# Patient Record
Sex: MALE | Race: WHITE | NOT HISPANIC OR LATINO | Employment: OTHER | URBAN - METROPOLITAN AREA
[De-identification: names, ages, dates, MRNs, and addresses within clinical notes are randomized per-mention and may not be internally consistent; named-entity substitution may affect disease eponyms.]

---

## 2017-11-17 PROBLEM — G45.9 TIA (TRANSIENT ISCHEMIC ATTACK): Status: ACTIVE | Noted: 2017-11-17

## 2017-11-18 PROBLEM — E78.5 HLD (HYPERLIPIDEMIA): Status: ACTIVE | Noted: 2017-11-18

## 2017-12-13 ENCOUNTER — OFFICE VISIT (OUTPATIENT)
Dept: MEDICAL GROUP | Facility: PHYSICIAN GROUP | Age: 57
End: 2017-12-13
Payer: COMMERCIAL

## 2017-12-13 VITALS
HEART RATE: 91 BPM | WEIGHT: 180 LBS | TEMPERATURE: 98.1 F | SYSTOLIC BLOOD PRESSURE: 120 MMHG | OXYGEN SATURATION: 94 % | BODY MASS INDEX: 25.77 KG/M2 | RESPIRATION RATE: 14 BRPM | DIASTOLIC BLOOD PRESSURE: 70 MMHG | HEIGHT: 70 IN

## 2017-12-13 DIAGNOSIS — G04.90 ENCEPHALITIS: ICD-10-CM

## 2017-12-13 PROCEDURE — 99203 OFFICE O/P NEW LOW 30 MIN: CPT | Performed by: FAMILY MEDICINE

## 2017-12-13 RX ORDER — ASPIRIN 81 MG/1
81 TABLET, CHEWABLE ORAL
COMMUNITY
End: 2018-05-22

## 2017-12-13 RX ORDER — METHYLPREDNISOLONE 4 MG/1
4 TABLET ORAL DAILY
COMMUNITY
End: 2018-05-22

## 2017-12-13 RX ORDER — VALACYCLOVIR HYDROCHLORIDE 1 G/1
1000 TABLET, FILM COATED ORAL 2 TIMES DAILY
COMMUNITY
End: 2018-05-22

## 2017-12-13 RX ORDER — LEVETIRACETAM 500 MG/1
500 TABLET ORAL 2 TIMES DAILY
COMMUNITY
End: 2018-09-04

## 2017-12-13 ASSESSMENT — PATIENT HEALTH QUESTIONNAIRE - PHQ9: CLINICAL INTERPRETATION OF PHQ2 SCORE: 0

## 2017-12-14 ASSESSMENT — ENCOUNTER SYMPTOMS
COUGH: 0
NECK PAIN: 0
NEAR-SYNCOPE: 0
FEVER: 0
MEMORY LOSS: 0
NEUROLOGICAL NEGATIVE: 1
PALPITATIONS: 0
DIZZINESS: 0
SLURRED SPEECH: 1
CARDIOVASCULAR NEGATIVE: 1
MUSCULOSKELETAL NEGATIVE: 1
MYALGIAS: 0
RESPIRATORY NEGATIVE: 1
HEMOPTYSIS: 0
CONSTIPATION: 0
EYES NEGATIVE: 1
LOSS OF BALANCE: 0
WEAKNESS: 0
HEADACHES: 0
CHILLS: 0
ALTERED MENTAL STATUS: 1
GASTROINTESTINAL NEGATIVE: 1
CONSTITUTIONAL NEGATIVE: 1
ABDOMINAL PAIN: 0

## 2017-12-14 NOTE — PROGRESS NOTES
Subjective:      Danielito Wagner is a 57 y.o. male who presents with Pain            Patient is a 57 y.o. Wm who had 2 episodes of slurring speech and confusion  First was seen a [facility] and dx with possible tia and then after second episode was sent to [facility]  There he was found to have some irregularities in the parietal region of his brain on mri with cerebritis/encephalopathy/cva/bleed all on the differential  He was treated with steroids and acyclovir and started on keppra  His sx have resolved and he reports no new issues  He has finished the steroids and acyclovir and continues on the keppra  The plan from the neurologist in hospital was for him to have a f/u MRI in 2 weeks and then see neurology as a f/u  He has the number to call to get his mri done and will do so and then f/u with neurology    There are no diagnoses linked to this encounter.  Past Medical History:  No date: Dislocation of left shoulder joint  11/18/2017: HLD (hyperlipidemia)  Past Surgical History:  12/31/2015: COLONOSCOPY - ENDO      Comment: Procedure: COLONOSCOPY - ENDO;  Surgeon:                Jordan Mancuso M.D.;  Location: SURGERY                [facility];  Service:   1/13/2015: INGUINAL HERNIA LAPAROSCOPIC BILATERAL      Comment: Performed by Danielito Byrne M.D. at SURGERY                [facility]  2005: FOOT SURGERY      Comment: cleaned joint out  Smoking status: Former Smoker                                                              Packs/day: 0.00      Years: 0.00         Types: Cigarettes     Quit date: 1/1/2003  Smokeless tobacco: Never Used                      Alcohol use: Yes           4.0 oz/week     Cans of beer: 8 per week     Comment: occas    Review of patient's family history indicates:    Heart Attack                   Paternal Grandmother        Current Outpatient Prescriptions: •  aspirin (ASA) 81 MG Chew Tab chewable tablet, 81 mg., Disp: , Rfl: •  levetiracetam (KEPPRA) 500 MG Tab, Take 500 mg  by mouth 2 times a day., Disp: , Rfl: •  methylPREDNISolone (MEDROL) 4 MG Tab, Take 4 mg by mouth every day., Disp: , Rfl: •  valacyclovir (VALTREX) 1 GM Tab, Take 1,000 mg by mouth 2 times a day., Disp: , Rfl: •  atorvastatin (LIPITOR) 40 MG Tab, Take 1 Tab by mouth every bedtime., Disp: 30 Tab, Rfl: 1•  aspirin  MG Tablet Delayed Response, Take 0.4985 Tabs by mouth every day., Disp: 60 Tab, Rfl: •  ibuprofen (MOTRIN) 200 MG TABS, Take 400 mg by mouth 1 time daily as needed for Mild Pain., Disp: , Rfl:     Patient was instructed on the use of medications, either prescriptions or OTC and informed on when the appropriate follow up time period should be. In addition, patient was also instructed that should any acute worsening occur that they should notify this clinic asap or call 911.        Neurological Problem   The patient's primary symptoms include an altered mental status and slurred speech. The patient's pertinent negatives include no loss of balance, memory loss, near-syncope, syncope or weakness. This is a new problem. The current episode started 1 to 4 weeks ago. The neurological problem developed suddenly. The problem has been resolved since onset. There was no focality noted. Pertinent negatives include no abdominal pain, auditory change, chest pain, dizziness, fever, headaches, neck pain or palpitations. Past treatments include bed rest (steroids and acyclovir and keppra). The treatment provided significant relief.       Review of Systems   Constitutional: Negative.  Negative for chills and fever.        Past Medical History:  No date: Dislocation of left shoulder joint  11/18/2017: HLD (hyperlipidemia)  Past Surgical History:  12/31/2015: COLONOSCOPY - ENDO      Comment: Procedure: COLONOSCOPY - ENDO;  Surgeon:                Jordan Mancuso M.D.;  Location: SURGERY                [facility];  Service:   1/13/2015: INGUINAL HERNIA LAPAROSCOPIC BILATERAL      Comment: Performed by Danielito Byrne,  "M.D. at SURGERY                [facility]  2005: FOOT SURGERY      Comment: cleaned joint out  Smoking status: Former Smoker                                                              Packs/day: 0.00      Years: 0.00         Types: Cigarettes     Quit date: 1/1/2003  Smokeless tobacco: Never Used                      Alcohol use: Yes           4.0 oz/week     Cans of beer: 8 per week     Comment: occas    Review of patient's family history indicates:    Heart Attack                   Paternal Grandmother       HENT: Negative.    Eyes: Negative.    Respiratory: Negative.  Negative for cough and hemoptysis.    Cardiovascular: Negative.  Negative for chest pain, palpitations and near-syncope.   Gastrointestinal: Negative.  Negative for abdominal pain and constipation.   Genitourinary: Negative.  Negative for dysuria and urgency.   Musculoskeletal: Negative.  Negative for myalgias and neck pain.   Skin: Negative.  Negative for rash.   Neurological: Negative.  Negative for dizziness, syncope, weakness, headaches and loss of balance.   Endo/Heme/Allergies: Negative.    Psychiatric/Behavioral: Negative for memory loss and suicidal ideas.          Objective:     /70   Pulse 91   Temp 36.7 °C (98.1 °F)   Resp 14   Ht 1.778 m (5' 10\")   Wt 81.6 kg (180 lb)   SpO2 94%   BMI 25.83 kg/m²      Physical Exam   Constitutional: He is oriented to person, place, and time. He appears well-developed and well-nourished. No distress.   HENT:   Head: Normocephalic and atraumatic.   Mouth/Throat: Oropharynx is clear and moist. No oropharyngeal exudate.   Eyes: Pupils are equal, round, and reactive to light.   Cardiovascular: Normal rate, regular rhythm, normal heart sounds and intact distal pulses.  Exam reveals no gallop and no friction rub.    No murmur heard.  Pulmonary/Chest: Effort normal and breath sounds normal. No respiratory distress. He has no wheezes. He has no rales. He exhibits no tenderness.   Neurological: He " is alert and oriented to person, place, and time. He displays normal reflexes. No cranial nerve deficit or sensory deficit. He exhibits normal muscle tone. Coordination normal.   Normal neuro exam today   Skin: He is not diaphoretic.   Psychiatric: He has a normal mood and affect. His behavior is normal. Judgment and thought content normal.   Nursing note and vitals reviewed.              Assessment/Plan:     There are no diagnoses linked to this encounter.    Brain event - cerebritis vs. Encephalitis  Will continue with meds and f/u after mri and neurology

## 2018-02-21 ENCOUNTER — OFFICE VISIT (OUTPATIENT)
Dept: MEDICAL GROUP | Facility: PHYSICIAN GROUP | Age: 58
End: 2018-02-21
Payer: COMMERCIAL

## 2018-02-21 VITALS
HEART RATE: 83 BPM | WEIGHT: 183 LBS | DIASTOLIC BLOOD PRESSURE: 90 MMHG | SYSTOLIC BLOOD PRESSURE: 132 MMHG | TEMPERATURE: 98.4 F | HEIGHT: 70 IN | RESPIRATION RATE: 16 BRPM | BODY MASS INDEX: 26.2 KG/M2 | OXYGEN SATURATION: 92 %

## 2018-02-21 DIAGNOSIS — G04.90 CEREBRITIS: ICD-10-CM

## 2018-02-21 PROBLEM — G45.9 TIA (TRANSIENT ISCHEMIC ATTACK): Status: RESOLVED | Noted: 2017-11-17 | Resolved: 2018-02-21

## 2018-02-21 PROCEDURE — 99214 OFFICE O/P EST MOD 30 MIN: CPT | Performed by: FAMILY MEDICINE

## 2018-02-21 NOTE — PROGRESS NOTES
Over 50% of this 25 minute visit was spent on counseling and coordination of care for the problem of  1. Cerebritis  Patient with sx of confusion and weakness. Initial workup in November with mri with swelling and fluid but no mass or trauma or cva  Started on keppra for seizure prevention and treated with steroids and acyclovir  Repeat mri this month still with some findings c/w vasculitis vs. Encephalitis  His neurologist dr. montgomery ordered a LP and labs from that which are pending  Patient reports feeling better with better energy level  Discussed with him today his differential diagnosis and the possible treatments lined up for him  In the future  All questions answered and has appt with neurology in 2 weeks to go over results of lp labs  Will f/u with me in 2 mo    Past Medical History:   Diagnosis Date   • Dislocation of left shoulder joint    • HLD (hyperlipidemia) 11/18/2017        Substance Use Topics   • Smoking status: Former Smoker     Types: Cigarettes     Quit date: 1/1/2003   • Smokeless tobacco: Never Used   • Alcohol use 4.0 oz/week     8 Cans of beer per week      Comment: occas     Family History   Problem Relation Age of Onset   • Heart Attack Paternal Grandmother          Current Outpatient Prescriptions:   •  aspirin (ASA) 81 MG Chew Tab chewable tablet, 81 mg., Disp: , Rfl:   •  levetiracetam (KEPPRA) 500 MG Tab, Take 500 mg by mouth 2 times a day., Disp: , Rfl:   •  atorvastatin (LIPITOR) 40 MG Tab, Take 1 Tab by mouth every bedtime., Disp: 30 Tab, Rfl: 1  •  ibuprofen (MOTRIN) 200 MG TABS, Take 400 mg by mouth 1 time daily as needed for Mild Pain., Disp: , Rfl:   •  methylPREDNISolone (MEDROL) 4 MG Tab, Take 4 mg by mouth every day., Disp: , Rfl:   •  valacyclovir (VALTREX) 1 GM Tab, Take 1,000 mg by mouth 2 times a day., Disp: , Rfl:   •  aspirin  MG Tablet Delayed Response, Take 0.4985 Tabs by mouth every day., Disp: 60 Tab, Rfl:     Patient was instructed on the use of medications,  either prescriptions or OTC and informed on when the appropriate follow up time period should be. In addition, patient was also instructed that should any acute worsening occur that they should notify this clinic asap or call 911.

## 2018-03-06 ENCOUNTER — TELEPHONE (OUTPATIENT)
Dept: MEDICAL GROUP | Facility: PHYSICIAN GROUP | Age: 58
End: 2018-03-06

## 2018-03-06 DIAGNOSIS — G04.90 CEREBRITIS: ICD-10-CM

## 2018-03-06 NOTE — TELEPHONE ENCOUNTER
Dr. Chatterjee office is requesting an updated referral, and was advised it needs to come from the primary care office. The pt has been seen since 11/2017 for this neurology complication.    Needs order faxed to [phone]

## 2018-04-16 ENCOUNTER — TELEPHONE (OUTPATIENT)
Dept: MEDICAL GROUP | Facility: PHYSICIAN GROUP | Age: 58
End: 2018-04-16

## 2018-04-25 ENCOUNTER — OFFICE VISIT (OUTPATIENT)
Dept: MEDICAL GROUP | Facility: PHYSICIAN GROUP | Age: 58
End: 2018-04-25
Payer: COMMERCIAL

## 2018-04-25 VITALS
HEIGHT: 70 IN | BODY MASS INDEX: 25.48 KG/M2 | DIASTOLIC BLOOD PRESSURE: 80 MMHG | SYSTOLIC BLOOD PRESSURE: 122 MMHG | TEMPERATURE: 96.7 F | WEIGHT: 178 LBS | OXYGEN SATURATION: 96 % | RESPIRATION RATE: 14 BRPM | HEART RATE: 74 BPM

## 2018-04-25 DIAGNOSIS — G04.90 CEREBRITIS: ICD-10-CM

## 2018-04-25 PROCEDURE — 99214 OFFICE O/P EST MOD 30 MIN: CPT | Performed by: FAMILY MEDICINE

## 2018-04-25 NOTE — PROGRESS NOTES
Over 50% of this 25 minute visit was spent on counseling and coordination of care regarding the patient's current problem of   1. Cerebritis  Had another episode of anxiety and tingling in his arms -went to [facility] and given high dose of steroids and those sx improved but somewhat shaky at times due to the steroids  Has an appt for full body ct this week to r/o any underlying infection and seeing jung of neurosurgery this week to get a dural biopsy to try and find cause of his issues - either autoimmune vs. Viral  All questions answered today and will do testing to see if we can find a cause  Other than shaky hands due to steroids, neuro exam normal today    Past Medical History:   Diagnosis Date   • Dislocation of left shoulder joint    • HLD (hyperlipidemia) 11/18/2017     Past Surgical History:   Procedure Laterality Date   • COLONOSCOPY - ENDO  12/31/2015    Procedure: COLONOSCOPY - ENDO;  Surgeon: Jordan Mancuso M.D.;  Location: SURGERY [facility];  Service:    • INGUINAL HERNIA LAPAROSCOPIC BILATERAL  1/13/2015    Performed by Danielito Byrne M.D. at SURGERY [facility]   • FOOT SURGERY  2005    cleaned joint out     Social History   Substance Use Topics   • Smoking status: Former Smoker     Types: Cigarettes     Quit date: 1/1/2003   • Smokeless tobacco: Never Used   • Alcohol use 4.0 oz/week     8 Cans of beer per week      Comment: occas     Family History   Problem Relation Age of Onset   • Heart Attack Paternal Grandmother          Current Outpatient Prescriptions:   •  levetiracetam (KEPPRA) 500 MG Tab, Take 500 mg by mouth 2 times a day., Disp: , Rfl:   •  valacyclovir (VALTREX) 1 GM Tab, Take 1,000 mg by mouth 2 times a day., Disp: , Rfl:   •  aspirin  MG Tablet Delayed Response, Take 0.4985 Tabs by mouth every day., Disp: 60 Tab, Rfl:   •  ibuprofen (MOTRIN) 200 MG TABS, Take 400 mg by mouth 1 time daily as needed for Mild Pain., Disp: , Rfl:   •  aspirin (ASA) 81 MG Chew Tab chewable tablet,  81 mg., Disp: , Rfl:   •  methylPREDNISolone (MEDROL) 4 MG Tab, Take 4 mg by mouth every day., Disp: , Rfl:   •  atorvastatin (LIPITOR) 40 MG Tab, Take 1 Tab by mouth every bedtime. (Patient not taking: Reported on 4/25/2018), Disp: 30 Tab, Rfl: 1    Patient was instructed on the use of medications, either prescriptions or OTC and informed on when the appropriate follow up time period should be. In addition, patient was also instructed that should any acute worsening occur that they should notify this clinic asap or call 911.

## 2018-05-22 ENCOUNTER — HOSPITAL ENCOUNTER (OUTPATIENT)
Dept: RADIOLOGY | Facility: MEDICAL CENTER | Age: 58
End: 2018-05-22
Attending: NEUROLOGICAL SURGERY | Admitting: NEUROLOGICAL SURGERY
Payer: COMMERCIAL

## 2018-05-22 DIAGNOSIS — Z01.812 PRE-OPERATIVE LABORATORY EXAMINATION: ICD-10-CM

## 2018-05-22 DIAGNOSIS — Z01.811 PRE-OPERATIVE RESPIRATORY EXAMINATION: ICD-10-CM

## 2018-05-22 DIAGNOSIS — Z01.810 PRE-OPERATIVE CARDIOVASCULAR EXAMINATION: ICD-10-CM

## 2018-05-22 LAB
ABO GROUP BLD: NORMAL
ANION GAP SERPL CALC-SCNC: 9 MMOL/L (ref 0–11.9)
APPEARANCE UR: CLEAR
APTT PPP: 29.8 SEC (ref 24.7–36)
BASOPHILS # BLD AUTO: 0.7 % (ref 0–1.8)
BASOPHILS # BLD: 0.04 K/UL (ref 0–0.12)
BILIRUB UR QL STRIP.AUTO: NEGATIVE
BLD GP AB SCN SERPL QL: NORMAL
BUN SERPL-MCNC: 13 MG/DL (ref 8–22)
CALCIUM SERPL-MCNC: 9.7 MG/DL (ref 8.5–10.5)
CHLORIDE SERPL-SCNC: 104 MMOL/L (ref 96–112)
CO2 SERPL-SCNC: 25 MMOL/L (ref 20–33)
COLOR UR: YELLOW
CREAT SERPL-MCNC: 0.98 MG/DL (ref 0.5–1.4)
EKG IMPRESSION: NORMAL
EOSINOPHIL # BLD AUTO: 0.1 K/UL (ref 0–0.51)
EOSINOPHIL NFR BLD: 1.7 % (ref 0–6.9)
ERYTHROCYTE [DISTWIDTH] IN BLOOD BY AUTOMATED COUNT: 40.6 FL (ref 35.9–50)
GLUCOSE SERPL-MCNC: 88 MG/DL (ref 65–99)
GLUCOSE UR STRIP.AUTO-MCNC: NEGATIVE MG/DL
HCT VFR BLD AUTO: 47.7 % (ref 42–52)
HGB BLD-MCNC: 16.4 G/DL (ref 14–18)
IMM GRANULOCYTES # BLD AUTO: 0.02 K/UL (ref 0–0.11)
IMM GRANULOCYTES NFR BLD AUTO: 0.3 % (ref 0–0.9)
INR PPP: 1.02 (ref 0.87–1.13)
KETONES UR STRIP.AUTO-MCNC: NEGATIVE MG/DL
LEUKOCYTE ESTERASE UR QL STRIP.AUTO: NEGATIVE
LYMPHOCYTES # BLD AUTO: 1.46 K/UL (ref 1–4.8)
LYMPHOCYTES NFR BLD: 25 % (ref 22–41)
MCH RBC QN AUTO: 31.2 PG (ref 27–33)
MCHC RBC AUTO-ENTMCNC: 34.4 G/DL (ref 33.7–35.3)
MCV RBC AUTO: 90.9 FL (ref 81.4–97.8)
MICRO URNS: NORMAL
MONOCYTES # BLD AUTO: 0.4 K/UL (ref 0–0.85)
MONOCYTES NFR BLD AUTO: 6.8 % (ref 0–13.4)
NEUTROPHILS # BLD AUTO: 3.83 K/UL (ref 1.82–7.42)
NEUTROPHILS NFR BLD: 65.5 % (ref 44–72)
NITRITE UR QL STRIP.AUTO: NEGATIVE
NRBC # BLD AUTO: 0 K/UL
NRBC BLD-RTO: 0 /100 WBC
PH UR STRIP.AUTO: 7 [PH]
PLATELET # BLD AUTO: 251 K/UL (ref 164–446)
PMV BLD AUTO: 10 FL (ref 9–12.9)
POTASSIUM SERPL-SCNC: 3.9 MMOL/L (ref 3.6–5.5)
PROT UR QL STRIP: NEGATIVE MG/DL
PROTHROMBIN TIME: 13.1 SEC (ref 12–14.6)
RBC # BLD AUTO: 5.25 M/UL (ref 4.7–6.1)
RBC UR QL AUTO: NEGATIVE
RH BLD: NORMAL
SODIUM SERPL-SCNC: 138 MMOL/L (ref 135–145)
SP GR UR STRIP.AUTO: 1.01
UROBILINOGEN UR STRIP.AUTO-MCNC: 0.2 MG/DL
WBC # BLD AUTO: 5.9 K/UL (ref 4.8–10.8)

## 2018-05-22 PROCEDURE — 71046 X-RAY EXAM CHEST 2 VIEWS: CPT

## 2018-05-22 PROCEDURE — 36415 COLL VENOUS BLD VENIPUNCTURE: CPT

## 2018-05-22 PROCEDURE — 85025 COMPLETE CBC W/AUTO DIFF WBC: CPT

## 2018-05-22 PROCEDURE — 93005 ELECTROCARDIOGRAM TRACING: CPT

## 2018-05-22 PROCEDURE — 80048 BASIC METABOLIC PNL TOTAL CA: CPT

## 2018-05-22 PROCEDURE — 81003 URINALYSIS AUTO W/O SCOPE: CPT

## 2018-05-22 PROCEDURE — 93010 ELECTROCARDIOGRAM REPORT: CPT | Performed by: INTERNAL MEDICINE

## 2018-05-22 PROCEDURE — 86901 BLOOD TYPING SEROLOGIC RH(D): CPT

## 2018-05-22 PROCEDURE — 85610 PROTHROMBIN TIME: CPT

## 2018-05-22 PROCEDURE — 86850 RBC ANTIBODY SCREEN: CPT

## 2018-05-22 PROCEDURE — 85730 THROMBOPLASTIN TIME PARTIAL: CPT

## 2018-05-22 PROCEDURE — 86900 BLOOD TYPING SEROLOGIC ABO: CPT

## 2018-05-23 ENCOUNTER — TELEPHONE (OUTPATIENT)
Dept: MEDICAL GROUP | Facility: PHYSICIAN GROUP | Age: 58
End: 2018-05-23

## 2018-05-23 NOTE — TELEPHONE ENCOUNTER
Patients disability formed are filled but just need to be signed patient is aware and we can call him once the forms have been signed.

## 2018-05-31 ENCOUNTER — HOSPITAL ENCOUNTER (OUTPATIENT)
Facility: MEDICAL CENTER | Age: 58
End: 2018-05-31
Attending: NEUROLOGICAL SURGERY | Admitting: NEUROLOGICAL SURGERY
Payer: COMMERCIAL

## 2018-05-31 ENCOUNTER — APPOINTMENT (OUTPATIENT)
Dept: RADIOLOGY | Facility: MEDICAL CENTER | Age: 58
End: 2018-05-31
Attending: NEUROLOGICAL SURGERY
Payer: COMMERCIAL

## 2018-05-31 VITALS
DIASTOLIC BLOOD PRESSURE: 74 MMHG | HEIGHT: 70 IN | HEART RATE: 80 BPM | WEIGHT: 174.38 LBS | RESPIRATION RATE: 16 BRPM | SYSTOLIC BLOOD PRESSURE: 115 MMHG | TEMPERATURE: 98 F | BODY MASS INDEX: 24.97 KG/M2 | OXYGEN SATURATION: 95 %

## 2018-05-31 LAB
ABO GROUP BLD: NORMAL
BLD GP AB SCN SERPL QL: NORMAL
RH BLD: NORMAL

## 2018-05-31 PROCEDURE — 700101 HCHG RX REV CODE 250

## 2018-05-31 PROCEDURE — 700111 HCHG RX REV CODE 636 W/ 250 OVERRIDE (IP)

## 2018-05-31 PROCEDURE — 86900 BLOOD TYPING SEROLOGIC ABO: CPT

## 2018-05-31 PROCEDURE — 70553 MRI BRAIN STEM W/O & W/DYE: CPT

## 2018-05-31 PROCEDURE — 86850 RBC ANTIBODY SCREEN: CPT

## 2018-05-31 PROCEDURE — 700101 HCHG RX REV CODE 250: Performed by: NEUROLOGICAL SURGERY

## 2018-05-31 PROCEDURE — 86901 BLOOD TYPING SEROLOGIC RH(D): CPT

## 2018-05-31 RX ORDER — LIDOCAINE HYDROCHLORIDE 10 MG/ML
INJECTION, SOLUTION EPIDURAL; INFILTRATION; INTRACAUDAL; PERINEURAL
Status: COMPLETED
Start: 2018-05-31 | End: 2018-05-31

## 2018-05-31 RX ORDER — SODIUM CHLORIDE, SODIUM LACTATE, POTASSIUM CHLORIDE, CALCIUM CHLORIDE 600; 310; 30; 20 MG/100ML; MG/100ML; MG/100ML; MG/100ML
INJECTION, SOLUTION INTRAVENOUS CONTINUOUS
Status: DISCONTINUED | OUTPATIENT
Start: 2018-05-31 | End: 2018-05-31 | Stop reason: HOSPADM

## 2018-05-31 RX ORDER — DIPHENHYDRAMINE HYDROCHLORIDE 50 MG/ML
INJECTION INTRAMUSCULAR; INTRAVENOUS
Status: COMPLETED
Start: 2018-05-31 | End: 2018-05-31

## 2018-05-31 RX ORDER — LIDOCAINE HYDROCHLORIDE 10 MG/ML
0.5 INJECTION, SOLUTION INFILTRATION; PERINEURAL
Status: DISCONTINUED | OUTPATIENT
Start: 2018-05-31 | End: 2018-05-31 | Stop reason: HOSPADM

## 2018-05-31 RX ADMIN — LIDOCAINE HYDROCHLORIDE 0.5 ML: 10 INJECTION, SOLUTION EPIDURAL; INFILTRATION; INTRACAUDAL; PERINEURAL at 15:20

## 2018-05-31 RX ADMIN — DIPHENHYDRAMINE HYDROCHLORIDE 25 MG: 50 INJECTION INTRAMUSCULAR; INTRAVENOUS at 16:15

## 2018-05-31 RX ADMIN — SODIUM CHLORIDE, SODIUM LACTATE, POTASSIUM CHLORIDE, CALCIUM CHLORIDE: 600; 310; 30; 20 INJECTION, SOLUTION INTRAVENOUS at 15:20

## 2018-05-31 RX ADMIN — LIDOCAINE HYDROCHLORIDE 0.5 ML: 10 INJECTION, SOLUTION INFILTRATION; PERINEURAL at 15:20

## 2018-05-31 ASSESSMENT — PAIN SCALES - GENERAL: PAINLEVEL_OUTOF10: 0

## 2018-05-31 NOTE — PROGRESS NOTES
The Medication Reconciliation process has been completed by interviewing the patient via telephone who reports that he is going to take his keppra at 1400 since he will be in surgery, that way he will have had his two doses today and will not require a dose until 6/1/18    Allergies have been reviewed  Antibiotic use in 30 days - none  Home Pharmacy:  [facility]

## 2018-06-01 NOTE — PROGRESS NOTES
No significant intraparenchymal T2/flair change to indicate ongoing inflammation, so no lesion to biopsy there. No dural enhancement, just cortical rim enhancement likely cortical arterial in nature. Not something we want to biopsy. Patient asymptomatic, I advised patient against random biopsy, and he agrees. He will go home and followup w/ Dr Chatetrjee of neurology

## 2018-06-19 ENCOUNTER — TELEPHONE (OUTPATIENT)
Dept: MEDICAL GROUP | Facility: PHYSICIAN GROUP | Age: 58
End: 2018-06-19

## 2018-06-19 NOTE — TELEPHONE ENCOUNTER
Patients disability company called and stated that one of the pages has unknown on it for the return to go back to work.I spoke to patient and he he is having brain surgery and is still being referred out to speclists. He agreed that waiting a year would be ok to see where he is at before making the decision if he can go back to work or not. I called and left a message with Elzbieta at [phone] stating that as of not the return date is 4/25/19.

## 2018-06-25 ENCOUNTER — OFFICE VISIT (OUTPATIENT)
Dept: MEDICAL GROUP | Facility: PHYSICIAN GROUP | Age: 58
End: 2018-06-25
Payer: COMMERCIAL

## 2018-06-25 ENCOUNTER — TELEPHONE (OUTPATIENT)
Dept: MEDICAL GROUP | Facility: PHYSICIAN GROUP | Age: 58
End: 2018-06-25

## 2018-06-25 VITALS
HEIGHT: 70 IN | BODY MASS INDEX: 24.62 KG/M2 | HEART RATE: 75 BPM | TEMPERATURE: 97.7 F | RESPIRATION RATE: 14 BRPM | SYSTOLIC BLOOD PRESSURE: 100 MMHG | DIASTOLIC BLOOD PRESSURE: 60 MMHG | OXYGEN SATURATION: 97 % | WEIGHT: 172 LBS

## 2018-06-25 DIAGNOSIS — L57.8 SOLAR ELASTOSIS: ICD-10-CM

## 2018-06-25 DIAGNOSIS — G04.90 CEREBRITIS: ICD-10-CM

## 2018-06-25 DIAGNOSIS — F41.9 ANXIETY: ICD-10-CM

## 2018-06-25 PROCEDURE — 99215 OFFICE O/P EST HI 40 MIN: CPT | Performed by: FAMILY MEDICINE

## 2018-06-25 RX ORDER — HYDRALAZINE HYDROCHLORIDE 25 MG/1
25 TABLET, FILM COATED ORAL 3 TIMES DAILY PRN
Qty: 90 TAB | Refills: 1 | Status: SHIPPED | OUTPATIENT
Start: 2018-06-25 | End: 2018-09-04

## 2018-06-25 NOTE — TELEPHONE ENCOUNTER
Patient requesting referral to [facility]. Needs a referral to be seen. Please sign pending referral. Thank you

## 2018-06-25 NOTE — PROGRESS NOTES
Over 50% of this 40 minute visit was spent on counseling and coordination of care regarding the patient's current problem of       1. Cerebritis  See disability form filled out and scanned into media    2. Anxiety    - hydrALAZINE (APRESOLINE) 25 MG Tab; Take 1 Tab by mouth 3 times a day as needed (anxiety).  Dispense: 90 Tab; Refill: 1    Past Medical History:   Diagnosis Date   • Dislocation of left shoulder joint    • HLD (hyperlipidemia) 11/18/2017     Past Surgical History:   Procedure Laterality Date   • COLONOSCOPY - ENDO  12/31/2015    Procedure: COLONOSCOPY - ENDO;  Surgeon: Jordan Mancuso M.D.;  Location: SURGERY [facility];  Service:    • INGUINAL HERNIA LAPAROSCOPIC BILATERAL  1/13/2015    Performed by Danielito Byrne M.D. at SURGERY [facility]   • FOOT SURGERY  2005    cleaned joint out     Social History   Substance Use Topics   • Smoking status: Former Smoker     Years: 10.00     Types: Cigarettes     Quit date: 1/1/2008   • Smokeless tobacco: Never Used   • Alcohol use No     Family History   Problem Relation Age of Onset   • Heart Attack Paternal Grandmother          Current Outpatient Prescriptions:   •  hydrALAZINE (APRESOLINE) 25 MG Tab, Take 1 Tab by mouth 3 times a day as needed (anxiety)., Disp: 90 Tab, Rfl: 1  •  levetiracetam (KEPPRA) 500 MG Tab, Take 500 mg by mouth 2 times a day., Disp: , Rfl:     Patient was instructed on the use of medications, either prescriptions or OTC and informed on when the appropriate follow up time period should be. In addition, patient was also instructed that should any acute worsening occur that they should notify this clinic asap or call 911.

## 2018-06-27 ENCOUNTER — TELEPHONE (OUTPATIENT)
Dept: MEDICAL GROUP | Facility: PHYSICIAN GROUP | Age: 58
End: 2018-06-27

## 2018-06-27 DIAGNOSIS — M35.9 AUTOIMMUNE CEREBRITIS (HCC): ICD-10-CM

## 2018-06-27 DIAGNOSIS — G05.3 AUTOIMMUNE CEREBRITIS (HCC): ICD-10-CM

## 2018-06-27 NOTE — TELEPHONE ENCOUNTER
Patient was prescribed hydralazine and the pharmacy wanted to make sure that you actually wanted this medication because this usually isn't treated for anxiety.

## 2018-07-31 ENCOUNTER — TELEPHONE (OUTPATIENT)
Dept: MEDICAL GROUP | Facility: PHYSICIAN GROUP | Age: 58
End: 2018-07-31

## 2018-07-31 NOTE — TELEPHONE ENCOUNTER
Pt came in with a referral from Dr. Chatterjee's office to [facility], however [facility] will not accept it since it did not come through his pcp. Pt would like to have a new referral placed. It is for a stat brain and dural biopsy with dx code G05.3

## 2018-08-01 ENCOUNTER — DOCUMENTATION (OUTPATIENT)
Dept: MEDICAL GROUP | Facility: PHYSICIAN GROUP | Age: 58
End: 2018-08-01

## 2018-08-02 NOTE — PROGRESS NOTES
Nia with auth [id number], called regarding the auth that was approved for the pt.     Auth # [id number]  Approved for 6 visits- Neuro 07/31/18-04/27/19

## 2018-09-04 ENCOUNTER — OFFICE VISIT (OUTPATIENT)
Dept: MEDICAL GROUP | Facility: PHYSICIAN GROUP | Age: 58
End: 2018-09-04
Payer: COMMERCIAL

## 2018-09-04 VITALS
BODY MASS INDEX: 24.91 KG/M2 | TEMPERATURE: 98.2 F | WEIGHT: 174 LBS | OXYGEN SATURATION: 96 % | SYSTOLIC BLOOD PRESSURE: 122 MMHG | HEART RATE: 84 BPM | DIASTOLIC BLOOD PRESSURE: 78 MMHG | HEIGHT: 70 IN | RESPIRATION RATE: 16 BRPM

## 2018-09-04 DIAGNOSIS — G04.90 CEREBRITIS: ICD-10-CM

## 2018-09-04 PROCEDURE — 99214 OFFICE O/P EST MOD 30 MIN: CPT | Performed by: FAMILY MEDICINE

## 2018-09-04 RX ORDER — HYDROXYZINE HYDROCHLORIDE 25 MG/1
25 TABLET, FILM COATED ORAL 3 TIMES DAILY PRN
COMMUNITY
End: 2018-09-04

## 2018-09-04 RX ORDER — METHYLPREDNISOLONE 4 MG/1
TABLET ORAL
Qty: 21 TAB | Refills: 0 | Status: SHIPPED | OUTPATIENT
Start: 2018-09-04 | End: 2019-11-19

## 2018-09-04 ASSESSMENT — ENCOUNTER SYMPTOMS
CHILLS: 0
BRUISES/BLEEDS EASILY: 0
FEVER: 0
PSYCHIATRIC NEGATIVE: 1
MUSCULOSKELETAL NEGATIVE: 1
PALPITATIONS: 0
DEPRESSION: 0
HEADACHES: 0
TINGLING: 0
BLURRED VISION: 0
DOUBLE VISION: 0
GASTROINTESTINAL NEGATIVE: 1
RESPIRATORY NEGATIVE: 1
HEARTBURN: 0
NAUSEA: 0
NEUROLOGICAL NEGATIVE: 1
COUGH: 0
CARDIOVASCULAR NEGATIVE: 1
HEMOPTYSIS: 0
EYES NEGATIVE: 1
MYALGIAS: 0
DIZZINESS: 0

## 2018-09-04 NOTE — PROGRESS NOTES
Subjective:      Danielito Wagner is a 57 y.o. male who presents with Anxiety            1. Cerebritis  Patient with a several year history of cerebritis, had a flare up last month that improved with steroids and would like an emergency rx to have on hand should he not be able to get a hold of his neurologist  Has an appt with neurosurgery next week  His visit with [facility] neurosurgery is pending for possible biopsy of brain to get definitive dx  Today feeling better with only some mild fatigue  - MethylPREDNISolone (MEDROL DOSEPAK) 4 MG Tablet Therapy Pack; As directed on the packaging label.  Dispense: 21 Tab; Refill: 0    Past Medical History:  No date: Dislocation of left shoulder joint  11/18/2017: HLD (hyperlipidemia)  Past Surgical History:  12/31/2015: COLONOSCOPY - ENDO      Comment:  Procedure: COLONOSCOPY - ENDO;  Surgeon: Jordan Mancuso M.D.;  Location: SURGERY [facility];  Service:  1/13/2015: INGUINAL HERNIA LAPAROSCOPIC BILATERAL      Comment:  Performed by Danielito Byrne M.D. at SURGERY [facility]  2005: FOOT SURGERY      Comment:  cleaned joint out  Smoking status: Former Smoker                                                              Packs/day: 0.00      Years: 10.00        Types: Cigarettes     Quit date: 1/1/2008  Smokeless tobacco: Never Used                      Alcohol use: No              Review of patient's family history indicates:  Problem: Heart Attack      Relation: Paternal Grandmother       Age of Onset: (Not Specified)       Current Outpatient Prescriptions: •  MethylPREDNISolone (MEDROL DOSEPAK) 4 MG Tablet Therapy Pack, As directed on the packaging label., Disp: 21 Tab, Rfl: 0    Patient was instructed on the use of medications, either prescriptions or OTC and informed on when the appropriate follow up time period should be. In addition, patient was also instructed that should any acute worsening occur that they should notify this clinic asap or call  "911.            Review of Systems   Constitutional: Positive for malaise/fatigue. Negative for chills and fever.   HENT: Negative.  Negative for hearing loss.    Eyes: Negative.  Negative for blurred vision and double vision.   Respiratory: Negative.  Negative for cough and hemoptysis.    Cardiovascular: Negative.  Negative for chest pain and palpitations.   Gastrointestinal: Negative.  Negative for heartburn and nausea.   Genitourinary: Negative.  Negative for dysuria.   Musculoskeletal: Negative.  Negative for myalgias.   Skin: Negative.  Negative for rash.   Neurological: Negative.  Negative for dizziness, tingling and headaches.   Endo/Heme/Allergies: Negative.  Does not bruise/bleed easily.   Psychiatric/Behavioral: Negative.  Negative for depression and suicidal ideas.   All other systems reviewed and are negative.         Objective:     /78   Pulse 84   Temp 36.8 °C (98.2 °F)   Resp 16   Ht 1.778 m (5' 10\")   Wt 78.9 kg (174 lb)   SpO2 96%   BMI 24.97 kg/m²      Physical Exam   Constitutional: He is oriented to person, place, and time. He appears well-developed and well-nourished. No distress.   HENT:   Head: Normocephalic and atraumatic.   Mouth/Throat: Oropharynx is clear and moist. No oropharyngeal exudate.   Eyes: Pupils are equal, round, and reactive to light.   Cardiovascular: Normal rate, regular rhythm, normal heart sounds and intact distal pulses.  Exam reveals no gallop and no friction rub.    No murmur heard.  Pulmonary/Chest: Effort normal and breath sounds normal. No respiratory distress. He has no wheezes. He has no rales. He exhibits no tenderness.   Neurological: He is alert and oriented to person, place, and time. He displays normal reflexes. No cranial nerve deficit or sensory deficit. He exhibits normal muscle tone. Coordination normal.   Skin: He is not diaphoretic.   Psychiatric: He has a normal mood and affect. His behavior is normal. Judgment and thought content normal. "   Nursing note and vitals reviewed.              Assessment/Plan:     1. Cerebritis    - MethylPREDNISolone (MEDROL DOSEPAK) 4 MG Tablet Therapy Pack; As directed on the packaging label.  Dispense: 21 Tab; Refill: 0

## 2018-10-31 ENCOUNTER — TELEPHONE (OUTPATIENT)
Dept: MEDICAL GROUP | Facility: PHYSICIAN GROUP | Age: 58
End: 2018-10-31

## 2018-10-31 NOTE — TELEPHONE ENCOUNTER
Pt came into office stating that he is needing a referral to come from his PCP in order for him to go to [facility] since that is where Shena Sutherland has referred him there for a consult on a brain biopsy. The dr he has been referred to is Dr. Ibarra with [facility].

## 2019-01-23 ENCOUNTER — OFFICE VISIT (OUTPATIENT)
Dept: MEDICAL GROUP | Facility: PHYSICIAN GROUP | Age: 59
End: 2019-01-23
Payer: COMMERCIAL

## 2019-01-23 VITALS
RESPIRATION RATE: 12 BRPM | SYSTOLIC BLOOD PRESSURE: 120 MMHG | HEIGHT: 70 IN | HEART RATE: 69 BPM | OXYGEN SATURATION: 95 % | DIASTOLIC BLOOD PRESSURE: 70 MMHG | BODY MASS INDEX: 25.2 KG/M2 | TEMPERATURE: 97.6 F | WEIGHT: 176 LBS

## 2019-01-23 DIAGNOSIS — L57.8 SOLAR ELASTOSIS: ICD-10-CM

## 2019-01-23 DIAGNOSIS — G04.90 CEREBRITIS: ICD-10-CM

## 2019-01-23 PROCEDURE — 99214 OFFICE O/P EST MOD 30 MIN: CPT | Performed by: FAMILY MEDICINE

## 2019-01-23 ASSESSMENT — PATIENT HEALTH QUESTIONNAIRE - PHQ9: CLINICAL INTERPRETATION OF PHQ2 SCORE: 0

## 2019-01-23 NOTE — LETTER
[facility]  Mio Mcdonald M.D.  [address]  [facility] [address]  Fax: [phone]   Authorization for Release/Disclosure of   Protected Health Information   Name: DANIELITO WAGNER : 1960 SSN: [id number]   Address: [address] [address] Phone:    [phone] (home) [phone] (work)   I authorize the entity listed below to release/disclose the PHI below to:   [facility]/Mio Mcdonald M.D. and Mio Mcdonald M.D.   Provider or Entity Name:     Address   City, State, [facility]   Phone:      Fax:     Reason for request: continuity of care   Information to be released:    [  ] LAST COLONOSCOPY,  including any PATH REPORT and follow-up  [  ] LAST FIT/COLOGUARD RESULT [  ] LAST DEXA  [  ] LAST MAMMOGRAM  [  ] LAST PAP  [  ] LAST LABS [  ] RETINA EXAM REPORT  [  ] IMMUNIZATION RECORDS  [  ] Release all info      [  ] Check here and initial the line next to each item to release ALL health information INCLUDING  _____ Care and treatment for drug and / or alcohol abuse  _____ HIV testing, infection status, or AIDS  _____ Genetic Testing    DATES OF SERVICE OR TIME PERIOD TO BE DISCLOSED: _____________  I understand and acknowledge that:  * This Authorization may be revoked at any time by you in writing, except if your health information has already been used or disclosed.  * Your health information that will be used or disclosed as a result of you signing this authorization could be re-disclosed by the recipient. If this occurs, your re-disclosed health information may no longer be protected by State or Federal laws.  * You may refuse to sign this Authorization. Your refusal will not affect your ability to obtain treatment.  * This Authorization becomes effective upon signing and will  on (date) __________.      If no date is indicated, this Authorization will  one (1) year from the signature date.    Name: Danielito Wagner    Signature:   Date:      1/23/2019       PLEASE FAX REQUESTED RECORDS BACK TO: [phone]

## 2019-03-08 ENCOUNTER — TELEPHONE (OUTPATIENT)
Dept: MEDICAL GROUP | Facility: PHYSICIAN GROUP | Age: 59
End: 2019-03-08

## 2019-03-08 NOTE — TELEPHONE ENCOUNTER
Pt is requesting referral to dermatology he has an appt with Dr. Avery in [place] on Tuesday 3/12/19. I explained this process will not be done before his appt.

## 2019-05-22 ENCOUNTER — OFFICE VISIT (OUTPATIENT)
Dept: MEDICAL GROUP | Facility: PHYSICIAN GROUP | Age: 59
End: 2019-05-22
Payer: COMMERCIAL

## 2019-05-22 VITALS
DIASTOLIC BLOOD PRESSURE: 60 MMHG | WEIGHT: 176 LBS | TEMPERATURE: 95.3 F | OXYGEN SATURATION: 98 % | BODY MASS INDEX: 25.2 KG/M2 | SYSTOLIC BLOOD PRESSURE: 100 MMHG | HEIGHT: 70 IN | HEART RATE: 71 BPM | RESPIRATION RATE: 12 BRPM

## 2019-05-22 DIAGNOSIS — G04.90 CEREBRITIS: ICD-10-CM

## 2019-05-22 DIAGNOSIS — E78.2 MIXED HYPERLIPIDEMIA: ICD-10-CM

## 2019-05-22 DIAGNOSIS — L57.8 SOLAR ELASTOSIS: ICD-10-CM

## 2019-05-22 PROCEDURE — 99214 OFFICE O/P EST MOD 30 MIN: CPT | Performed by: FAMILY MEDICINE

## 2019-05-22 ASSESSMENT — ENCOUNTER SYMPTOMS
DEPRESSION: 0
HEARTBURN: 0
BLURRED VISION: 0
NAUSEA: 0
FEVER: 0
BRUISES/BLEEDS EASILY: 0
PSYCHIATRIC NEGATIVE: 1
CHILLS: 0
DOUBLE VISION: 0
CARDIOVASCULAR NEGATIVE: 1
TINGLING: 0
DIZZINESS: 0
COUGH: 0
PALPITATIONS: 0
MUSCULOSKELETAL NEGATIVE: 1
WEAKNESS: 1
RESPIRATORY NEGATIVE: 1
HEMOPTYSIS: 0
GASTROINTESTINAL NEGATIVE: 1
HEADACHES: 0
MYALGIAS: 0
EYES NEGATIVE: 1

## 2019-05-22 NOTE — PROGRESS NOTES
Subjective:      Danielito Wagner is a 58 y.o. male who presents with Pain (Cerebritis)            1. Cerebritis  Followed by neurology at [place]  Improving but still with sx waxing and waning  Sx primarily fatigue and diffuse weakness  No focal sx  Due to improvement in sx likely autoimmune instead of infectious  Advised on inflammatory diet changes he can do    2. 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 Mixed hyperlipidemia  Currently treated for HLD, taking meds with no new myalgias or joint pain, watching fats in diet  controlled      Past Medical History:  No date: Dislocation of left shoulder joint  11/18/2017: HLD (hyperlipidemia)  Past Surgical History:  12/31/2015: COLONOSCOPY - ENDO      Comment:  Procedure: COLONOSCOPY - ENDO;  Surgeon: Jordan Mancuso M.D.;  Location: SURGERY [facility];  Service:  1/13/2015: INGUINAL HERNIA LAPAROSCOPIC BILATERAL      Comment:  Performed by Danielito Byrne M.D. at SURGERY [facility]  2005: FOOT SURGERY      Comment:  cleaned joint out  Smoking status: Former Smoker                                                              Packs/day: 0.00      Years: 10.00        Types: Cigarettes     Quit date: 1/1/2008  Smokeless tobacco: Never Used                      Alcohol use: No              Review of patient's family history indicates:  Problem: Heart Attack      Relation: Paternal Grandmother       Age of Onset: (Not Specified)       Current Outpatient Prescriptions: •  MethylPREDNISolone (MEDROL DOSEPAK) 4 MG Tablet Therapy Pack, As directed on the packaging label., Disp: 21 Tab, Rfl: 0    Patient was instructed on the use of medications, either prescriptions or OTC and informed on when the appropriate follow up time period should be. In addition, patient was also instructed that should any acute worsening occur that they should notify this clinic  "asap or call 911.            Review of Systems   Constitutional: Positive for malaise/fatigue. Negative for chills and fever.   HENT: Negative.  Negative for hearing loss.    Eyes: Negative.  Negative for blurred vision and double vision.   Respiratory: Negative.  Negative for cough and hemoptysis.    Cardiovascular: Negative.  Negative for chest pain and palpitations.   Gastrointestinal: Negative.  Negative for heartburn and nausea.   Genitourinary: Negative.  Negative for dysuria.   Musculoskeletal: Negative.  Negative for myalgias.   Skin: Negative.  Negative for rash.   Neurological: Positive for weakness. Negative for dizziness, tingling and headaches.   Endo/Heme/Allergies: Negative.  Does not bruise/bleed easily.   Psychiatric/Behavioral: Negative.  Negative for depression and suicidal ideas.   All other systems reviewed and are negative.         Objective:     /60   Pulse 71   Temp (!) 35.2 °C (95.3 °F)   Resp 12   Ht 1.778 m (5' 10\")   Wt 79.8 kg (176 lb)   SpO2 98%   BMI 25.25 kg/m²      Physical Exam   Constitutional: He is oriented to person, place, and time. He appears well-developed and well-nourished. No distress.   HENT:   Head: Normocephalic and atraumatic.   Mouth/Throat: Oropharynx is clear and moist. No oropharyngeal exudate.   Eyes: Pupils are equal, round, and reactive to light.   Cardiovascular: Normal rate, regular rhythm, normal heart sounds and intact distal pulses.  Exam reveals no gallop and no friction rub.    No murmur heard.  Pulmonary/Chest: Effort normal and breath sounds normal. No respiratory distress. He has no wheezes. He has no rales. He exhibits no tenderness.   Neurological: He is alert and oriented to person, place, and time.   Skin: He is not diaphoretic.   Psychiatric: He has a normal mood and affect. His behavior is normal. Judgment and thought content normal.   Nursing note and vitals reviewed.              Assessment/Plan:     1. Cerebritis      2. Solar " elastosis    - REFERRAL TO DERMATOLOGY    3. Mixed hyperlipidemia

## 2019-07-26 ENCOUNTER — TELEPHONE (OUTPATIENT)
Dept: MEDICAL GROUP | Facility: PHYSICIAN GROUP | Age: 59
End: 2019-07-26

## 2019-11-19 ENCOUNTER — OFFICE VISIT (OUTPATIENT)
Dept: MEDICAL GROUP | Facility: PHYSICIAN GROUP | Age: 59
End: 2019-11-19
Payer: COMMERCIAL

## 2019-11-19 VITALS
OXYGEN SATURATION: 98 % | HEIGHT: 70 IN | HEART RATE: 82 BPM | BODY MASS INDEX: 23.19 KG/M2 | RESPIRATION RATE: 12 BRPM | DIASTOLIC BLOOD PRESSURE: 78 MMHG | TEMPERATURE: 98.6 F | SYSTOLIC BLOOD PRESSURE: 122 MMHG | WEIGHT: 162 LBS

## 2019-11-19 DIAGNOSIS — G04.90 CEREBRITIS: ICD-10-CM

## 2019-11-19 PROCEDURE — 99214 OFFICE O/P EST MOD 30 MIN: CPT | Performed by: FAMILY MEDICINE

## 2019-11-19 RX ORDER — INFLUENZA A VIRUS A/BRISBANE/02/2018 IVR-190 (H1N1) ANTIGEN (FORMALDEHYDE INACTIVATED), INFLUENZA A VIRUS A/KANSAS/14/2017 X-327 (H3N2) ANTIGEN (FORMALDEHYDE INACTIVATED), INFLUENZA B VIRUS B/PHUKET/3073/2013 ANTIGEN (FORMALDEHYDE INACTIVATED), AND INFLUENZA B VIRUS B/MARYLAND/15/2016 BX-69A ANTIGEN (FORMALDEHYDE INACTIVATED) 15; 15; 15; 15 UG/.5ML; UG/.5ML; UG/.5ML; UG/.5ML
INJECTION, SUSPENSION INTRAMUSCULAR
COMMUNITY
Start: 2019-10-26 | End: 2020-02-20

## 2019-11-19 NOTE — PROGRESS NOTES
Over 50% of this 25 minute visit was spent on counseling and coordination of care for the problem of  1. Cerebritis  Seen by [place] and they recommend continued surveillance of his condition, he reports some continued fatigue and confusion at times, worse when tired but still with slow steady improvement of his condition. [place] did not recommend any brain biopsy for his condition due to the continued improvement and we will adhere to this plan, needs a new neurologist due to prior one leaving. Neuro exam today non focal  - REFERRAL TO NEUROLOGY    Past Medical History:   Diagnosis Date   • Dislocation of left shoulder joint    • HLD (hyperlipidemia) 2017     Past Surgical History:   Procedure Laterality Date   • COLONOSCOPY - ENDO  2015    Procedure: COLONOSCOPY - ENDO;  Surgeon: Jordan Mancuso M.D.;  Location: SURGERY [facility];  Service:    • INGUINAL HERNIA LAPAROSCOPIC BILATERAL  2015    Performed by Danielito Byrne M.D. at SURGERY [facility]   • FOOT SURGERY      cleaned joint out     Social History     Tobacco Use   • Smoking status: Former Smoker     Years: 10.00     Types: Cigarettes     Last attempt to quit: 2008     Years since quittin.8   • Smokeless tobacco: Never Used   Substance Use Topics   • Alcohol use: No     Alcohol/week: 4.0 oz     Types: 8 Cans of beer per week   • Drug use: No     Family History   Problem Relation Age of Onset   • Heart Attack Paternal Grandmother          Current Outpatient Medications:   •  FLUZONE QUADRIVALENT 0.5 ML Suspension Prefilled Syringe injection, , Disp: , Rfl:     Patient was instructed on the use of medications, either prescriptions or OTC and informed on when the appropriate follow up time period should be. In addition, patient was also instructed that should any acute worsening occur that they should notify this clinic asap or call 911.

## 2020-07-20 ENCOUNTER — OFFICE VISIT (OUTPATIENT)
Dept: MEDICAL GROUP | Facility: PHYSICIAN GROUP | Age: 60
End: 2020-07-20
Payer: COMMERCIAL

## 2020-07-20 VITALS
RESPIRATION RATE: 16 BRPM | SYSTOLIC BLOOD PRESSURE: 124 MMHG | OXYGEN SATURATION: 94 % | HEART RATE: 79 BPM | WEIGHT: 168.3 LBS | TEMPERATURE: 97.7 F | BODY MASS INDEX: 24.09 KG/M2 | HEIGHT: 70 IN | DIASTOLIC BLOOD PRESSURE: 82 MMHG

## 2020-07-20 DIAGNOSIS — M35.9 AUTOIMMUNE CEREBRITIS (HCC): ICD-10-CM

## 2020-07-20 DIAGNOSIS — G05.3 AUTOIMMUNE CEREBRITIS (HCC): ICD-10-CM

## 2020-07-20 PROCEDURE — 99214 OFFICE O/P EST MOD 30 MIN: CPT | Performed by: FAMILY MEDICINE

## 2020-07-20 ASSESSMENT — PATIENT HEALTH QUESTIONNAIRE - PHQ9: CLINICAL INTERPRETATION OF PHQ2 SCORE: 0

## 2020-07-20 ASSESSMENT — FIBROSIS 4 INDEX: FIB4 SCORE: 0.95

## 2020-07-20 NOTE — PROGRESS NOTES
Over 50% of this 25 minute visit ( all time was face to face) was spent on counseling and coordination of care for the problem of  1. Autoimmune cerebritis (HCC)  Still with some sx of fatigue with exertion but no confusion or altered mental status or headaches  Has f/u [facility] with neurology this month will monitor    Past Medical History:   Diagnosis Date   • Dislocation of left shoulder joint    • HLD (hyperlipidemia) 2017     Past Surgical History:   Procedure Laterality Date   • COLONOSCOPY - ENDO  2015    Procedure: COLONOSCOPY - ENDO;  Surgeon: Jordan Mancuso M.D.;  Location: SURGERY [facility];  Service:    • INGUINAL HERNIA LAPAROSCOPIC BILATERAL  2015    Performed by Danielito Byrne M.D. at SURGERY [facility]   • FOOT SURGERY      cleaned joint out     Social History     Tobacco Use   • Smoking status: Former Smoker     Years: 10.00     Types: Cigarettes     Last attempt to quit: 2008     Years since quittin.5   • Smokeless tobacco: Never Used   Substance Use Topics   • Alcohol use: No     Alcohol/week: 4.0 oz     Types: 8 Cans of beer per week   • Drug use: No     Family History   Problem Relation Age of Onset   • Heart Attack Paternal Grandmother        No current outpatient medications on file.    Patient was instructed on the use of medications, either prescriptions or OTC and informed on when the appropriate follow up time period should be. In addition, patient was also instructed that should any acute worsening occur that they should notify this clinic asap or call 911.

## 2020-10-15 ENCOUNTER — HOSPITAL ENCOUNTER (OUTPATIENT)
Dept: LAB | Facility: MEDICAL CENTER | Age: 60
End: 2020-10-15
Attending: PSYCHIATRY & NEUROLOGY
Payer: MEDICARE

## 2020-10-15 DIAGNOSIS — M79.89 OTHER SPECIFIED SOFT TISSUE DISORDERS: ICD-10-CM

## 2020-10-15 DIAGNOSIS — M24.29 DISORDER OF LIGAMENT, OTHER SPECIFIED SITE: ICD-10-CM

## 2020-10-15 DIAGNOSIS — G04.90 CEREBRITIS: ICD-10-CM

## 2020-10-15 LAB
APTT PPP: 36.3 SEC (ref 24.7–36)
BASOPHILS # BLD AUTO: 1 % (ref 0–1.8)
BASOPHILS # BLD: 0.04 K/UL (ref 0–0.12)
EOSINOPHIL # BLD AUTO: 0.15 K/UL (ref 0–0.51)
EOSINOPHIL NFR BLD: 3.6 % (ref 0–6.9)
ERYTHROCYTE [DISTWIDTH] IN BLOOD BY AUTOMATED COUNT: 45.5 FL (ref 35.9–50)
HCT VFR BLD AUTO: 49.3 % (ref 42–52)
HGB BLD-MCNC: 16.4 G/DL (ref 14–18)
IMM GRANULOCYTES # BLD AUTO: 0.01 K/UL (ref 0–0.11)
IMM GRANULOCYTES NFR BLD AUTO: 0.2 % (ref 0–0.9)
INR PPP: 0.98 (ref 0.87–1.13)
LYMPHOCYTES # BLD AUTO: 1.04 K/UL (ref 1–4.8)
LYMPHOCYTES NFR BLD: 25.2 % (ref 22–41)
MCH RBC QN AUTO: 31.3 PG (ref 27–33)
MCHC RBC AUTO-ENTMCNC: 33.3 G/DL (ref 33.7–35.3)
MCV RBC AUTO: 94.1 FL (ref 81.4–97.8)
MONOCYTES # BLD AUTO: 0.33 K/UL (ref 0–0.85)
MONOCYTES NFR BLD AUTO: 8 % (ref 0–13.4)
NEUTROPHILS # BLD AUTO: 2.56 K/UL (ref 1.82–7.42)
NEUTROPHILS NFR BLD: 62 % (ref 44–72)
NRBC # BLD AUTO: 0 K/UL
NRBC BLD-RTO: 0 /100 WBC
PLATELET # BLD AUTO: 245 K/UL (ref 164–446)
PMV BLD AUTO: 10.6 FL (ref 9–12.9)
PROTHROMBIN TIME: 13.3 SEC (ref 12–14.6)
RBC # BLD AUTO: 5.24 M/UL (ref 4.7–6.1)
WBC # BLD AUTO: 4.1 K/UL (ref 4.8–10.8)

## 2020-10-15 PROCEDURE — 86635 COCCIDIOIDES ANTIBODY: CPT | Mod: 91

## 2020-10-15 PROCEDURE — 86162 COMPLEMENT TOTAL (CH50): CPT

## 2020-10-15 PROCEDURE — 86638 Q FEVER ANTIBODY: CPT

## 2020-10-15 PROCEDURE — 86757 RICKETTSIA ANTIBODY: CPT | Mod: 91

## 2020-10-15 PROCEDURE — 86160 COMPLEMENT ANTIGEN: CPT | Mod: 91

## 2020-10-15 PROCEDURE — 85730 THROMBOPLASTIN TIME PARTIAL: CPT

## 2020-10-15 PROCEDURE — 85610 PROTHROMBIN TIME: CPT

## 2020-10-15 PROCEDURE — 85025 COMPLETE CBC W/AUTO DIFF WBC: CPT

## 2020-10-15 PROCEDURE — 36415 COLL VENOUS BLD VENIPUNCTURE: CPT

## 2020-10-15 PROCEDURE — 86698 HISTOPLASMA ANTIBODY: CPT

## 2020-10-16 LAB
C3 SERPL-MCNC: 141.9 MG/DL (ref 87–200)
C4 SERPL-MCNC: 20.4 MG/DL (ref 19–52)

## 2020-10-17 LAB — CH50 SERPL-ACNC: 91.1 U/ML (ref 38.7–89.9)

## 2020-10-18 LAB
R RICKETTSI IGG TITR SER IF: NORMAL {TITER}
R RICKETTSI IGM TITR SER IF: NORMAL {TITER}
R TYPHI IGG TITR SER IF: NORMAL {TITER}
R TYPHI IGM TITR SER IF: NORMAL {TITER}

## 2020-10-20 LAB
C IMMITIS IGM SPEC QL IA: 0.4 IV
COCCIDIOIDES AB SPEC QL ID: NORMAL
COCCIDIOIDES AB TITR SER CF: NORMAL {TITER}
COCCIDIOIDES IGG SPEC QL IA: 0.2 IV
H CAPSUL AB TITR SER ID: NORMAL {TITER}

## 2020-10-21 LAB — TEST NAME 95000: NORMAL

## 2021-01-20 ENCOUNTER — HOSPITAL ENCOUNTER (OUTPATIENT)
Dept: LAB | Facility: MEDICAL CENTER | Age: 61
End: 2021-01-20
Attending: FAMILY MEDICINE
Payer: MEDICARE

## 2021-01-20 ENCOUNTER — OFFICE VISIT (OUTPATIENT)
Dept: MEDICAL GROUP | Facility: PHYSICIAN GROUP | Age: 61
End: 2021-01-20
Payer: MEDICARE

## 2021-01-20 VITALS
DIASTOLIC BLOOD PRESSURE: 76 MMHG | BODY MASS INDEX: 25.05 KG/M2 | HEIGHT: 70 IN | OXYGEN SATURATION: 94 % | TEMPERATURE: 97.5 F | WEIGHT: 175 LBS | SYSTOLIC BLOOD PRESSURE: 114 MMHG | HEART RATE: 92 BPM

## 2021-01-20 DIAGNOSIS — R79.9 ABNORMAL FINDING OF BLOOD CHEMISTRY, UNSPECIFIED: ICD-10-CM

## 2021-01-20 DIAGNOSIS — M35.9 AUTOIMMUNE CEREBRITIS (HCC): ICD-10-CM

## 2021-01-20 DIAGNOSIS — G05.3 AUTOIMMUNE CEREBRITIS (HCC): ICD-10-CM

## 2021-01-20 DIAGNOSIS — R53.81 MALAISE AND FATIGUE: ICD-10-CM

## 2021-01-20 DIAGNOSIS — R53.83 MALAISE AND FATIGUE: ICD-10-CM

## 2021-01-20 LAB
25(OH)D3 SERPL-MCNC: 36 NG/ML (ref 30–100)
ALBUMIN SERPL BCP-MCNC: 4.6 G/DL (ref 3.2–4.9)
ALBUMIN/GLOB SERPL: 1.6 G/DL
ALP SERPL-CCNC: 52 U/L (ref 30–99)
ALT SERPL-CCNC: 51 U/L (ref 2–50)
ANION GAP SERPL CALC-SCNC: 10 MMOL/L (ref 7–16)
AST SERPL-CCNC: 33 U/L (ref 12–45)
BILIRUB SERPL-MCNC: 0.4 MG/DL (ref 0.1–1.5)
BUN SERPL-MCNC: 11 MG/DL (ref 8–22)
CALCIUM SERPL-MCNC: 9.6 MG/DL (ref 8.5–10.5)
CHLORIDE SERPL-SCNC: 103 MMOL/L (ref 96–112)
CO2 SERPL-SCNC: 26 MMOL/L (ref 20–33)
CREAT SERPL-MCNC: 0.9 MG/DL (ref 0.5–1.4)
ERYTHROCYTE [DISTWIDTH] IN BLOOD BY AUTOMATED COUNT: 43.1 FL (ref 35.9–50)
FOLATE SERPL-MCNC: 21.9 NG/ML
GLOBULIN SER CALC-MCNC: 2.8 G/DL (ref 1.9–3.5)
GLUCOSE SERPL-MCNC: 94 MG/DL (ref 65–99)
HCT VFR BLD AUTO: 50 % (ref 42–52)
HGB BLD-MCNC: 17 G/DL (ref 14–18)
IRON SATN MFR SERPL: 41 % (ref 15–55)
IRON SERPL-MCNC: 121 UG/DL (ref 50–180)
MCH RBC QN AUTO: 31.8 PG (ref 27–33)
MCHC RBC AUTO-ENTMCNC: 34 G/DL (ref 33.7–35.3)
MCV RBC AUTO: 93.5 FL (ref 81.4–97.8)
PLATELET # BLD AUTO: 243 K/UL (ref 164–446)
PMV BLD AUTO: 10.1 FL (ref 9–12.9)
POTASSIUM SERPL-SCNC: 4.4 MMOL/L (ref 3.6–5.5)
PROT SERPL-MCNC: 7.4 G/DL (ref 6–8.2)
RBC # BLD AUTO: 5.35 M/UL (ref 4.7–6.1)
SODIUM SERPL-SCNC: 139 MMOL/L (ref 135–145)
T3 SERPL-MCNC: 110 NG/DL (ref 60–181)
T4 FREE SERPL-MCNC: 1.09 NG/DL (ref 0.93–1.7)
TESTOST SERPL-MCNC: 537 NG/DL (ref 175–781)
TIBC SERPL-MCNC: 296 UG/DL (ref 250–450)
TSH SERPL DL<=0.005 MIU/L-ACNC: 0.96 UIU/ML (ref 0.38–5.33)
UIBC SERPL-MCNC: 175 UG/DL (ref 110–370)
VIT B12 SERPL-MCNC: 679 PG/ML (ref 211–911)
WBC # BLD AUTO: 5.7 K/UL (ref 4.8–10.8)

## 2021-01-20 PROCEDURE — 82306 VITAMIN D 25 HYDROXY: CPT

## 2021-01-20 PROCEDURE — 84439 ASSAY OF FREE THYROXINE: CPT

## 2021-01-20 PROCEDURE — 99214 OFFICE O/P EST MOD 30 MIN: CPT | Performed by: FAMILY MEDICINE

## 2021-01-20 PROCEDURE — 80053 COMPREHEN METABOLIC PANEL: CPT

## 2021-01-20 PROCEDURE — 36415 COLL VENOUS BLD VENIPUNCTURE: CPT

## 2021-01-20 PROCEDURE — 84480 ASSAY TRIIODOTHYRONINE (T3): CPT

## 2021-01-20 PROCEDURE — 82746 ASSAY OF FOLIC ACID SERUM: CPT

## 2021-01-20 PROCEDURE — 84443 ASSAY THYROID STIM HORMONE: CPT

## 2021-01-20 PROCEDURE — 85027 COMPLETE CBC AUTOMATED: CPT

## 2021-01-20 PROCEDURE — 84403 ASSAY OF TOTAL TESTOSTERONE: CPT

## 2021-01-20 PROCEDURE — 83540 ASSAY OF IRON: CPT

## 2021-01-20 PROCEDURE — 83550 IRON BINDING TEST: CPT

## 2021-01-20 PROCEDURE — 82607 VITAMIN B-12: CPT

## 2021-01-20 ASSESSMENT — ENCOUNTER SYMPTOMS
COUGH: 0
NAUSEA: 0
RESPIRATORY NEGATIVE: 1
EYES NEGATIVE: 1
CARDIOVASCULAR NEGATIVE: 1
FEVER: 0
DEPRESSION: 0
DOUBLE VISION: 0
HEADACHES: 0
MUSCULOSKELETAL NEGATIVE: 1
CHILLS: 0
PALPITATIONS: 0
HEMOPTYSIS: 0
TINGLING: 0
DIZZINESS: 0
GASTROINTESTINAL NEGATIVE: 1
MYALGIAS: 0
NEUROLOGICAL NEGATIVE: 1
BLURRED VISION: 0
PSYCHIATRIC NEGATIVE: 1
HEARTBURN: 0
GENERAL WELL-BEING: GOOD
BRUISES/BLEEDS EASILY: 0

## 2021-01-20 ASSESSMENT — FIBROSIS 4 INDEX: FIB4 SCORE: 0.97

## 2021-01-20 ASSESSMENT — PATIENT HEALTH QUESTIONNAIRE - PHQ9
5. POOR APPETITE OR OVEREATING: 0 - NOT AT ALL
SUM OF ALL RESPONSES TO PHQ QUESTIONS 1-9: 11
CLINICAL INTERPRETATION OF PHQ2 SCORE: 3

## 2021-01-20 ASSESSMENT — ACTIVITIES OF DAILY LIVING (ADL): BATHING_REQUIRES_ASSISTANCE: 0

## 2021-01-20 NOTE — PROGRESS NOTES
Subjective:      Victor Manuel Wagner is a 60 y.o. male who presents with Annual Exam (Annual Exam)            1. Autoimmune cerebritis (HCC)  Not currently doing any treatment, still f/u with neurology  No pain or dizziness but some fatigue that has not gone away and would like to check to make sure nothing else is going on  - Patient has been identified as having a positive depression screening. Appropriate orders and counseling have been given.  - Comp Metabolic Panel; Future  - FREE THYROXINE; Future  - TRIIDOTHYRONINE; Future  - TESTOSTERONE SERUM; Future  - TSH; Future  - VITAMIN D,25 HYDROXY; Future  - CBC WITHOUT DIFFERENTIAL; Future  - IRON/TOTAL IRON BIND; Future  - VITAMIN B12; Future  - FOLATE; Future    2. Malaise and fatigue    - Comp Metabolic Panel; Future  - FREE THYROXINE; Future  - TRIIDOTHYRONINE; Future  - TESTOSTERONE SERUM; Future  - TSH; Future  - VITAMIN D,25 HYDROXY; Future  - CBC WITHOUT DIFFERENTIAL; Future  - IRON/TOTAL IRON BIND; Future  - VITAMIN B12; Future  - FOLATE; Future    3. Abnormal finding of blood chemistry, unspecified     - IRON/TOTAL IRON BIND; Future    Past Medical History:  No date: Dislocation of left shoulder joint  2017: HLD (hyperlipidemia)  Past Surgical History:  2020: IR RECOVERY ROOM      Comment:  Procedure: IR RECOVERY ROOM;  Surgeon: Ir-Recovery                Surgery;  Location: SURGERY [place];  Service: Recovery  2015: COLONOSCOPY - ENDO      Comment:  Procedure: COLONOSCOPY - ENDO;  Surgeon: Jordan Mancuso M.D.;  Location: [facility];  Service:  2015: INGUINAL HERNIA LAPAROSCOPIC BILATERAL      Comment:  Performed by Danielito Byrne M.D. at SURGERY [facility]  2005: FOOT SURGERY      Comment:  cleaned joint out  Social History    Tobacco Use      Smoking status: Former Smoker        Years: 10.00        Types: Cigarettes        Quit date: 2008        Years since quittin.0      Smokeless tobacco: Never  "Used    Alcohol use: No      Alcohol/week: 4.0 oz      Types: 8 Cans of beer per week    Drug use: No    Review of patient's family history indicates:  Problem: Heart Attack      Relation: Paternal Grandmother          Age of Onset: (Not Specified)      No current outpatient medications on file.    Patient was instructed on the use of medications, either prescriptions or OTC and informed on when the appropriate follow up time period should be. In addition, patient was also instructed that should any acute worsening occur that they should notify this clinic asap or call 911.        Annual Exam  Pertinent negatives include no chest pain, chills, coughing, fever, headaches, myalgias, nausea or rash.       Review of Systems   Constitutional: Positive for malaise/fatigue. Negative for chills and fever.   HENT: Negative.  Negative for hearing loss.    Eyes: Negative.  Negative for blurred vision and double vision.   Respiratory: Negative.  Negative for cough and hemoptysis.    Cardiovascular: Negative.  Negative for chest pain and palpitations.   Gastrointestinal: Negative.  Negative for heartburn and nausea.   Genitourinary: Negative.  Negative for dysuria.   Musculoskeletal: Negative.  Negative for myalgias.   Skin: Negative.  Negative for rash.   Neurological: Negative.  Negative for dizziness, tingling and headaches.   Endo/Heme/Allergies: Negative.  Does not bruise/bleed easily.   Psychiatric/Behavioral: Negative.  Negative for depression and suicidal ideas.   All other systems reviewed and are negative.         Objective:     /76   Pulse 92   Temp 36.4 °C (97.5 °F)   Ht 1.778 m (5' 10\")   Wt 79.4 kg (175 lb)   SpO2 94%   BMI 25.11 kg/m²      Physical Exam  Vitals signs and nursing note reviewed.   Constitutional:       General: He is not in acute distress.     Appearance: He is well-developed. He is not diaphoretic.   HENT:      Head: Normocephalic and atraumatic.      Mouth/Throat:      Pharynx: No " oropharyngeal exudate.   Eyes:      Pupils: Pupils are equal, round, and reactive to light.   Cardiovascular:      Rate and Rhythm: Normal rate and regular rhythm.      Heart sounds: Normal heart sounds. No murmur. No friction rub. No gallop.    Pulmonary:      Effort: Pulmonary effort is normal. No respiratory distress.      Breath sounds: Normal breath sounds. No wheezing or rales.   Chest:      Chest wall: No tenderness.   Neurological:      Mental Status: He is alert and oriented to person, place, and time.   Psychiatric:         Behavior: Behavior normal.         Thought Content: Thought content normal.         Judgment: Judgment normal.                 Assessment/Plan:        1. Autoimmune cerebritis (HCC)    - Patient has been identified as having a positive depression screening. Appropriate orders and counseling have been given.  - Comp Metabolic Panel; Future  - FREE THYROXINE; Future  - TRIIDOTHYRONINE; Future  - TESTOSTERONE SERUM; Future  - TSH; Future  - VITAMIN D,25 HYDROXY; Future  - CBC WITHOUT DIFFERENTIAL; Future  - IRON/TOTAL IRON BIND; Future  - VITAMIN B12; Future  - FOLATE; Future    2. Malaise and fatigue    - Comp Metabolic Panel; Future  - FREE THYROXINE; Future  - TRIIDOTHYRONINE; Future  - TESTOSTERONE SERUM; Future  - TSH; Future  - VITAMIN D,25 HYDROXY; Future  - CBC WITHOUT DIFFERENTIAL; Future  - IRON/TOTAL IRON BIND; Future  - VITAMIN B12; Future  - FOLATE; Future    3. Abnormal finding of blood chemistry, unspecified     - IRON/TOTAL IRON BIND; Future

## 2021-08-28 ENCOUNTER — PATIENT MESSAGE (OUTPATIENT)
Dept: HEALTH INFORMATION MANAGEMENT | Facility: OTHER | Age: 61
End: 2021-08-28

## 2022-01-31 ENCOUNTER — OFFICE VISIT (OUTPATIENT)
Dept: MEDICAL GROUP | Facility: PHYSICIAN GROUP | Age: 62
End: 2022-01-31
Payer: MEDICARE

## 2022-01-31 VITALS
HEART RATE: 104 BPM | OXYGEN SATURATION: 95 % | WEIGHT: 182 LBS | BODY MASS INDEX: 26.05 KG/M2 | TEMPERATURE: 98.4 F | RESPIRATION RATE: 16 BRPM | DIASTOLIC BLOOD PRESSURE: 82 MMHG | HEIGHT: 70 IN | SYSTOLIC BLOOD PRESSURE: 146 MMHG

## 2022-01-31 DIAGNOSIS — Z00.00 MEDICARE ANNUAL WELLNESS VISIT, SUBSEQUENT: ICD-10-CM

## 2022-01-31 DIAGNOSIS — G05.3 AUTOIMMUNE CEREBRITIS (HCC): ICD-10-CM

## 2022-01-31 DIAGNOSIS — M35.9 AUTOIMMUNE CEREBRITIS (HCC): ICD-10-CM

## 2022-01-31 PROCEDURE — G0438 PPPS, INITIAL VISIT: HCPCS | Performed by: FAMILY MEDICINE

## 2022-01-31 ASSESSMENT — PATIENT HEALTH QUESTIONNAIRE - PHQ9: CLINICAL INTERPRETATION OF PHQ2 SCORE: 0

## 2022-01-31 ASSESSMENT — FIBROSIS 4 INDEX: FIB4 SCORE: 1.16

## 2022-01-31 ASSESSMENT — ENCOUNTER SYMPTOMS: GENERAL WELL-BEING: FAIR

## 2022-01-31 ASSESSMENT — ACTIVITIES OF DAILY LIVING (ADL): BATHING_REQUIRES_ASSISTANCE: 0

## 2022-01-31 NOTE — PROGRESS NOTES
Chief Complaint   Patient presents with   • Annual Exam         HPI:  Victor Manuel is a 61 y.o. here for Medicare Annual Wellness Visit        Patient Active Problem List    Diagnosis Date Noted   • Cerebritis 04/25/2018   • HLD (hyperlipidemia) 11/18/2017   • Shoulder dislocation 01/07/2015       No current outpatient medications on file.     No current facility-administered medications for this visit.        Patient is taking medications as noted in medication list.  Current supplements as per medication list.     Allergies: Contrast media with iodine [iodine]    Current social contact/activities:      Is patient current with immunizations? Yes.    He  reports that he quit smoking about 14 years ago. His smoking use included cigarettes. He quit after 10.00 years of use. He has never used smokeless tobacco. He reports that he does not drink alcohol and does not use drugs.  Counseling given: Not Answered        DPA/Advanced directive: Patient does not have an Advanced Directive.  A packet and workshop information was given on Advanced Directives.    ROS:    Gait: Uses no assistive device   Ostomy: No   Other tubes: No   Amputations: No   Chronic oxygen use No   Last eye exam 2021   Wears hearing aids: No   : Denies any urinary leakage during the last 6 months      Screening:        Depression Screening    Little interest or pleasure in doing things?  0 - not at all  Feeling down, depressed, or hopeless? 0 - not at all  Patient Health Questionnaire Score: 0    If depressive symptoms identified deferred to follow up visit unless specifically addressed in assessment and plan.    Interpretation of PHQ-9 Total Score   Score Severity   1-4 No Depression   5-9 Mild Depression   10-14 Moderate Depression   15-19 Moderately Severe Depression   20-27 Severe Depression    Screening for Cognitive Impairment    Three Minute Recall (captain, garden, picture)  2/3    Gualberto clock face with all 12 numbers and set the hands to show 5 past  8.  Yes    If cognitive concerns identified, deferred for follow up unless specifically addressed in assessment and plan.    Fall Risk Assessment    Has the patient had two or more falls in the last year or any fall with injury in the last year?  No  If fall risk identified, deferred for follow up unless specifically addressed in assessment and plan.    Safety Assessment    Throw rugs on floor.  No  Handrails on all stairs.  Yes  Good lighting in all hallways.  Yes  Difficulty hearing.  No  Patient counseled about all safety risks that were identified.    Functional Assessment ADLs    Are there any barriers preventing you from cooking for yourself or meeting nutritional needs?  No.    Are there any barriers preventing you from driving safely or obtaining transportation?  No.    Are there any barriers preventing you from using a telephone or calling for help?  No.    Are there any barriers preventing you from shopping?  No.    Are there any barriers preventing you from taking care of your own finances?  No.    Are there any barriers preventing you from managing your medications?  No.    Are there any barriers preventing you from showering, bathing or dressing yourself?  No.    Are you currently engaging in any exercise or physical activity?  Yes.  Very little  What is your perception of your health?  Fair.    Health Maintenance Summary          Overdue - Annual Wellness Visit (Once) Overdue - never done    No completion history exists for this topic.          Overdue - COVID-19 Vaccine (1) Overdue - never done    No completion history exists for this topic.          Overdue - IMM ZOSTER VACCINES (2 of 2) Overdue since 1/14/2021 11/19/2020  Imm Admin: Zoster Vaccine Recombinant (RZV) (SHINGRIX)          Overdue - IMM INFLUENZA (1) Overdue since 9/1/2021    10/26/2019  Imm Admin: Influenza Vaccine Quad Inj (Pf)    11/03/2018  Imm Admin: Influenza Vac Subunit Quad Inj (Pf)    10/27/2015  Imm Admin: INFLUENZA TIV  (IM)          COLORECTAL CANCER SCREENING (COLONOSCOPY - Every 10 Years) Next due on 2025  COLONOSCOPY (Done)    2015  Surgical Procedure: COLONOSCOPY - ENDO          IMM DTaP/Tdap/Td Vaccine (2 - Td or Tdap) Next due on 2016  Imm Admin: Tdap Vaccine          IMM HEP B VACCINE (Series Information) Aged Out    No completion history exists for this topic.          IMM MENINGOCOCCAL VACCINE (MCV4) (Series Information) Aged Out    No completion history exists for this topic.          IMM PNEUMOCOCCAL VACCINE: 0-64 Years (Series Information) Aged Out    No completion history exists for this topic.          Discontinued - HEPATITIS C SCREENING  Discontinued    No completion history exists for this topic.                Patient Care Team:  Mio Mcdonald M.D. as PCP - General (Family Medicine)  Mio Mcdonald M.D. as PCP - [facility] Paneled    Social History     Tobacco Use   • Smoking status: Former Smoker     Years: 10.00     Types: Cigarettes     Quit date: 2008     Years since quittin.0   • Smokeless tobacco: Never Used   Substance Use Topics   • Alcohol use: No     Alcohol/week: 4.0 oz     Types: 8 Cans of beer per week   • Drug use: No     Family History   Problem Relation Age of Onset   • Heart Attack Paternal Grandmother      He  has a past medical history of Dislocation of left shoulder joint and HLD (hyperlipidemia) (2017). He also has no past medical history of Addisons disease (HCC), Adrenal disorder (HCC), Allergy, unspecified not elsewhere classified, Anemia, Anesthesia, Anxiety, Arrhythmia, Arthritis, Asthma, Asymptomatic human immunodeficiency virus (HIV) infection status (HCC), Blood transfusion, without reported diagnosis, Breath shortness, Cancer (HCC), CHF (congestive heart failure) (HCC), Chronic airway obstruction, not elsewhere classified, Clotting disorder (HCC), Cold, Congestive heart failure (HCC), Cushings syndrome (HCC), Dental disorder,  "Depression, Diabetic neuropathy (HCC), Emphysema, GERD (gastroesophageal reflux disease), Glaucoma, Goiter, Headache(784.0), Headache, classical migraine, Heart attack (HCC), Heart murmur, Hepatitis A, Hepatitis B, Hepatitis C, Hypertension, IBD (inflammatory bowel disease), Kidney disease, Meningitis, Muscle disorder, Myocardial infarct (HCC), Osteoporosis, unspecified, Other and unspecified angina pectoris, Pacemaker, Parathyroid disorder (HCC), Pituitary disease (HCC), Pneumonia, Psychiatric problem, Renal disorder, Rheumatic fever, Seizure (HCC), Sickle cell disease (HCC), Sleep apnea, Snoring, Stroke (HCC), Substance abuse (HCC), Thyroid disease, Tuberculosis, Type II or unspecified type diabetes mellitus without mention of complication, not stated as uncontrolled, Ulcer, Unspecified disorder of thyroid, Unspecified hemorrhagic conditions, Unspecified urinary incontinence, or Urinary tract infection, site not specified.   Past Surgical History:   Procedure Laterality Date   • IR RECOVERY ROOM  11/25/2020    Procedure: IR RECOVERY ROOM;  Surgeon: Ir-Recovery Surgery;  Location: SURGERY [place];  Service: Recovery   • COLONOSCOPY - ENDO  12/31/2015    Procedure: COLONOSCOPY - ENDO;  Surgeon: Jordan Mancuso M.D.;  Location: SURGERY [facility];  Service:    • INGUINAL HERNIA LAPAROSCOPIC BILATERAL  1/13/2015    Performed by Danielito Byrne M.D. at SURGERY [place] [facility]   • FOOT SURGERY  2005    cleaned joint out           Exam:     /82 (BP Location: Left arm, Patient Position: Sitting, BP Cuff Size: Adult)   Pulse (!) 104   Temp 36.9 °C (98.4 °F) (Temporal)   Resp 16   Ht 1.778 m (5' 10\")   Wt 82.6 kg (182 lb)   SpO2 95%  Body mass index is 26.11 kg/m².    Hearing excellent.    Dentition good  Alert, oriented in no acute distress.  Eye contact is good, speech goal directed, affect calm      Assessment and Plan. The following treatment and monitoring plan is recommended:      1. Medicare annual wellness " visit, subsequent  utd on hm    2. Autoimmune cerebritis (HCC)  Sees neurology here and at [place]  Minimal sx unless he gets upset or has his heart rate go up and then gets the pressure and burning in his head. Advised on low impact walking and other things to help    Past Medical History:   Diagnosis Date   • Dislocation of left shoulder joint    • HLD (hyperlipidemia) 2017     Past Surgical History:   Procedure Laterality Date   • IR RECOVERY ROOM  2020    Procedure: IR RECOVERY ROOM;  Surgeon: Ir-Recovery Surgery;  Location: SURGERY [place];  Service: Recovery   • COLONOSCOPY - ENDO  2015    Procedure: COLONOSCOPY - ENDO;  Surgeon: Jordan Mancuso M.D.;  Location: SURGERY [facility];  Service:    • INGUINAL HERNIA LAPAROSCOPIC BILATERAL  2015    Performed by Danielito Byrne M.D. at SURGERY [place] [facility]   • FOOT SURGERY      cleaned joint out     Social History     Tobacco Use   • Smoking status: Former Smoker     Years: 10.00     Types: Cigarettes     Quit date: 2008     Years since quittin.0   • Smokeless tobacco: Never Used   Substance Use Topics   • Alcohol use: No     Alcohol/week: 4.0 oz     Types: 8 Cans of beer per week   • Drug use: No     Family History   Problem Relation Age of Onset   • Heart Attack Paternal Grandmother        No current outpatient medications on file.    Patient was instructed on the use of medications, either prescriptions or OTC and informed on when the appropriate follow up time period should be. In addition, patient was also instructed that should any acute worsening occur that they should notify this clinic asap or call 911.        Services suggested: No services needed at this time  Health Care Screening recommendations as per orders if indicated.  Referrals offered: PT/OT/Nutrition counseling/Behavioral Health/Smoking cessation as per orders if indicated.    Discussion today about general wellness and lifestyle habits:    · Prevent falls  and reduce trip hazards; Cautioned about securing or removing rugs.  · Have a working fire alarm and carbon monoxide detector;   · Engage in regular physical activity and social activities       Follow-up: No follow-ups on file.

## 2022-02-15 ENCOUNTER — PATIENT MESSAGE (OUTPATIENT)
Dept: HEALTH INFORMATION MANAGEMENT | Facility: OTHER | Age: 62
End: 2022-02-15
Payer: MEDICARE

## 2022-03-24 DIAGNOSIS — G04.90 CEREBRITIS: ICD-10-CM

## 2022-03-24 RX ORDER — METHYLPREDNISOLONE 4 MG/1
4 TABLET ORAL DAILY
COMMUNITY
End: 2022-03-24 | Stop reason: SDUPTHER

## 2022-03-24 NOTE — TELEPHONE ENCOUNTER
Patient would like to know if Dr. Mcdonald could order a new prescription for Methylprednisolone 4 MG. [facility] [place] is accurate. Thank you

## 2022-03-28 RX ORDER — METHYLPREDNISOLONE 4 MG/1
4 TABLET ORAL DAILY
Qty: 21 TABLET | Refills: 0 | Status: SHIPPED | OUTPATIENT
Start: 2022-03-28 | End: 2022-06-07

## 2022-06-06 ENCOUNTER — TELEPHONE (OUTPATIENT)
Dept: HEALTH INFORMATION MANAGEMENT | Facility: OTHER | Age: 62
End: 2022-06-06
Payer: MEDICARE

## 2022-06-07 ENCOUNTER — OFFICE VISIT (OUTPATIENT)
Dept: MEDICAL GROUP | Facility: PHYSICIAN GROUP | Age: 62
End: 2022-06-07
Payer: MEDICARE

## 2022-06-07 VITALS
BODY MASS INDEX: 25.37 KG/M2 | SYSTOLIC BLOOD PRESSURE: 136 MMHG | RESPIRATION RATE: 16 BRPM | HEIGHT: 70 IN | DIASTOLIC BLOOD PRESSURE: 84 MMHG | WEIGHT: 177.2 LBS | OXYGEN SATURATION: 96 % | HEART RATE: 82 BPM | TEMPERATURE: 97.8 F

## 2022-06-07 DIAGNOSIS — R10.30 LOWER ABDOMINAL PAIN: ICD-10-CM

## 2022-06-07 DIAGNOSIS — M35.9 AUTOIMMUNE CEREBRITIS (HCC): ICD-10-CM

## 2022-06-07 DIAGNOSIS — Z98.890 HISTORY OF HERNIA REPAIR: ICD-10-CM

## 2022-06-07 DIAGNOSIS — Z87.19 HISTORY OF HERNIA REPAIR: ICD-10-CM

## 2022-06-07 DIAGNOSIS — G05.3 AUTOIMMUNE CEREBRITIS (HCC): ICD-10-CM

## 2022-06-07 PROCEDURE — 99214 OFFICE O/P EST MOD 30 MIN: CPT | Performed by: FAMILY MEDICINE

## 2022-06-07 ASSESSMENT — ENCOUNTER SYMPTOMS
HEMOPTYSIS: 0
CHILLS: 0
ABDOMINAL PAIN: 1
DOUBLE VISION: 0
MYALGIAS: 0
PSYCHIATRIC NEGATIVE: 1
TINGLING: 0
FEVER: 0
DEPRESSION: 0
PALPITATIONS: 0
HEARTBURN: 0
BLURRED VISION: 0
HEADACHES: 0
EYES NEGATIVE: 1
RESPIRATORY NEGATIVE: 1
NAUSEA: 0
MUSCULOSKELETAL NEGATIVE: 1
NEUROLOGICAL NEGATIVE: 1
DIZZINESS: 0
CARDIOVASCULAR NEGATIVE: 1
CONSTITUTIONAL NEGATIVE: 1
COUGH: 0
BRUISES/BLEEDS EASILY: 0

## 2022-06-07 ASSESSMENT — FIBROSIS 4 INDEX: FIB4 SCORE: 1.16

## 2022-06-07 NOTE — PROGRESS NOTES
Subjective     Victor Manuel Wagner is a 61 y.o. male who presents with Other (Having pain and pressure on his stomach for a week and a half wants to make sure its not a hernia  )            1. Autoimmune cerebritis (HCC)  stable    2. History of hernia repair  7 years ago had left inguinal hernia repair with mesh via umbilical incision  Now with diffuse mild abdominal pain for past 2 weeks, not worse or better with eating or drinking or bm, no fever or chills  On exam no acute abdomen today   CT-ABDOMEN-PELVIS W/O; Future    3. Lower abdominal pain     CT-ABDOMEN-PELVIS W/O; Future    Past Medical History:  No date: Dislocation of left shoulder joint  2017: HLD (hyperlipidemia)  Past Surgical History:  2020: IR RECOVERY ROOM      Comment:  Procedure: IR RECOVERY ROOM;  Surgeon: Ir-Recovery                Surgery;  Location: SURGERY [place];  Service: Recovery  2015: COLONOSCOPY - ENDO      Comment:  Procedure: COLONOSCOPY - ENDO;  Surgeon: Jordan Mancuso M.D.;  Location: SURGERY [facility];  Service:  2015: INGUINAL HERNIA LAPAROSCOPIC BILATERAL      Comment:  Performed by Danielito Byrne M.D. at SURGERY [place] [facility]  2005: FOOT SURGERY      Comment:  cleaned joint out  Social History    Tobacco Use      Smoking status: Former Smoker        Years: 10.00        Types: Cigarettes        Quit date: 2008        Years since quittin.4      Smokeless tobacco: Never Used    Alcohol use: No      Alcohol/week: 4.0 oz      Types: 8 Cans of beer per week    Drug use: No    Review of patient's family history indicates:  Problem: Heart Attack      Relation: Paternal Grandmother          Age of Onset: (Not Specified)      No current outpatient medications on file.    Patient was instructed on the use of medications, either prescriptions or OTC and informed on when the appropriate follow up time period should be. In addition, patient was also instructed that should any acute worsening  "occur that they should notify this clinic asap or call 911.          Review of Systems   Constitutional: Negative.  Negative for chills and fever.   HENT: Negative.  Negative for hearing loss.    Eyes: Negative.  Negative for blurred vision and double vision.   Respiratory: Negative.  Negative for cough and hemoptysis.    Cardiovascular: Negative.  Negative for chest pain and palpitations.   Gastrointestinal: Positive for abdominal pain. Negative for heartburn and nausea.   Genitourinary: Negative.  Negative for dysuria.   Musculoskeletal: Negative.  Negative for myalgias.   Skin: Negative.  Negative for rash.   Neurological: Negative.  Negative for dizziness, tingling and headaches.   Endo/Heme/Allergies: Negative.  Does not bruise/bleed easily.   Psychiatric/Behavioral: Negative.  Negative for depression and suicidal ideas.   All other systems reviewed and are negative.             Objective     /84   Pulse 82   Temp 36.6 °C (97.8 °F) (Temporal)   Resp 16   Ht 1.778 m (5' 10\")   Wt 80.4 kg (177 lb 3.2 oz)   SpO2 96%   BMI 25.43 kg/m²      Physical Exam  Vitals and nursing note reviewed.   Constitutional:       General: He is not in acute distress.     Appearance: He is well-developed. He is not diaphoretic.   HENT:      Head: Normocephalic and atraumatic.      Mouth/Throat:      Pharynx: No oropharyngeal exudate.   Eyes:      Pupils: Pupils are equal, round, and reactive to light.   Cardiovascular:      Rate and Rhythm: Normal rate and regular rhythm.      Heart sounds: Normal heart sounds. No murmur heard.    No friction rub. No gallop.   Pulmonary:      Effort: Pulmonary effort is normal. No respiratory distress.      Breath sounds: Normal breath sounds. No wheezing or rales.   Chest:      Chest wall: No tenderness.   Abdominal:      General: Abdomen is flat. Bowel sounds are normal.      Palpations: Abdomen is soft.      Tenderness: There is generalized abdominal tenderness. There is no right CVA " tenderness, left CVA tenderness, guarding or rebound. Negative signs include Mcfarlane's sign, Rovsing's sign, McBurney's sign, psoas sign and obturator sign.      Hernia: No hernia is present.   Neurological:      Mental Status: He is alert and oriented to person, place, and time.   Psychiatric:         Behavior: Behavior normal.         Thought Content: Thought content normal.         Judgment: Judgment normal.                             Assessment & Plan        1. Autoimmune cerebritis (HCC)      2. History of hernia repair    - CT-ABDOMEN-PELVIS W/O; Future    3. Lower abdominal pain    - CT-ABDOMEN-PELVIS W/O; Future

## 2022-06-08 ENCOUNTER — HOSPITAL ENCOUNTER (OUTPATIENT)
Dept: RADIOLOGY | Facility: MEDICAL CENTER | Age: 62
End: 2022-06-08
Attending: FAMILY MEDICINE
Payer: MEDICARE

## 2022-06-08 DIAGNOSIS — Z87.19 HISTORY OF HERNIA REPAIR: ICD-10-CM

## 2022-06-08 DIAGNOSIS — Z98.890 HISTORY OF HERNIA REPAIR: ICD-10-CM

## 2022-06-08 DIAGNOSIS — R10.30 LOWER ABDOMINAL PAIN: ICD-10-CM

## 2022-06-08 PROCEDURE — 74176 CT ABD & PELVIS W/O CONTRAST: CPT | Mod: ME

## 2022-06-09 ENCOUNTER — TELEPHONE (OUTPATIENT)
Dept: MEDICAL GROUP | Facility: PHYSICIAN GROUP | Age: 62
End: 2022-06-09
Payer: MEDICARE

## 2022-06-09 NOTE — TELEPHONE ENCOUNTER
Called pt to let him know that there are small hernias seen on ct in groin and umbilicus, and that Dr. Mcdonald placed a referral for him  to see the surgeon about it.

## 2022-06-14 PROBLEM — E66.3 OVERWEIGHT WITH BODY MASS INDEX (BMI) OF 26 TO 26.9 IN ADULT: Status: ACTIVE | Noted: 2022-06-14

## 2022-10-08 ENCOUNTER — OFFICE VISIT (OUTPATIENT)
Dept: URGENT CARE | Facility: CLINIC | Age: 62
End: 2022-10-08
Payer: MEDICARE

## 2022-10-08 VITALS
WEIGHT: 170 LBS | HEART RATE: 72 BPM | TEMPERATURE: 97.3 F | RESPIRATION RATE: 14 BRPM | DIASTOLIC BLOOD PRESSURE: 70 MMHG | SYSTOLIC BLOOD PRESSURE: 126 MMHG | OXYGEN SATURATION: 97 % | BODY MASS INDEX: 24.34 KG/M2 | HEIGHT: 70 IN

## 2022-10-08 DIAGNOSIS — T81.49XA SURGICAL WOUND INFECTION: ICD-10-CM

## 2022-10-08 DIAGNOSIS — Z23 NEED FOR INFLUENZA VACCINATION: ICD-10-CM

## 2022-10-08 PROCEDURE — 99213 OFFICE O/P EST LOW 20 MIN: CPT | Mod: 25 | Performed by: PHYSICIAN ASSISTANT

## 2022-10-08 PROCEDURE — 90686 IIV4 VACC NO PRSV 0.5 ML IM: CPT | Performed by: PHYSICIAN ASSISTANT

## 2022-10-08 PROCEDURE — G0008 ADMIN INFLUENZA VIRUS VAC: HCPCS | Performed by: PHYSICIAN ASSISTANT

## 2022-10-08 RX ORDER — CEPHALEXIN 500 MG/1
500 CAPSULE ORAL 4 TIMES DAILY
Qty: 28 CAPSULE | Refills: 0 | Status: SHIPPED | OUTPATIENT
Start: 2022-10-08 | End: 2022-10-15

## 2022-10-08 ASSESSMENT — FIBROSIS 4 INDEX: FIB4 SCORE: 0.98

## 2022-10-08 ASSESSMENT — ENCOUNTER SYMPTOMS
VOMITING: 0
MYALGIAS: 0
FEVER: 0
HEADACHES: 0
NAUSEA: 0
CHILLS: 0

## 2022-10-08 NOTE — PROGRESS NOTES
"Subjective     Victor Manuel Wagner is a 61 y.o. male who presents with Wound Infection (Surgical site possible infection, abdomen post op 9/5/22)            The patient had laparoscopic appendectomy about 1 month ago. He is concerned about a post-surgical wound. He states it is red and is tender. He denies any fever or chills. No nausea, vomiting or body aches. No drainage from the wound. He has been doing nothing for his symptoms.    Past Medical History:   Diagnosis Date    Dislocation of left shoulder joint     HLD (hyperlipidemia) 11/18/2017       Past Surgical History:   Procedure Laterality Date    IR RECOVERY ROOM  11/25/2020    Procedure: IR RECOVERY ROOM;  Surgeon: Ir-Recovery Surgery;  Location: SURGERY [place];  Service: Recovery    COLONOSCOPY - ENDO  12/31/2015    Procedure: COLONOSCOPY - ENDO;  Surgeon: Jordan Mancuso M.D.;  Location: SURGERY [facility];  Service:     INGUINAL HERNIA LAPAROSCOPIC BILATERAL  1/13/2015    Performed by Danielito Byrne M.D. at SURGERY [place] [facility]    FOOT SURGERY  2005    cleaned joint out         Family History   Problem Relation Age of Onset    Heart Attack Paternal Grandmother          Contrast media with iodine [iodine]    Medications, Allergies, and current problem list reviewed today in Epic    Review of Systems   Constitutional:  Negative for chills, fever and malaise/fatigue.   Gastrointestinal:  Negative for nausea and vomiting.   Musculoskeletal:  Negative for myalgias.   Skin:         Redness, swelling, pain on post-surgical abdominal wound    Neurological:  Negative for headaches.     All other systems reviewed and are negative.            Objective     /70 (BP Location: Left arm, Patient Position: Sitting, BP Cuff Size: Adult)   Pulse 72   Temp 36.3 °C (97.3 °F) (Temporal)   Resp 14   Ht 1.778 m (5' 10\")   Wt 77.1 kg (170 lb)   SpO2 97%   BMI 24.39 kg/m²      Physical Exam  Constitutional:       General: He is not in acute distress.     Appearance: " He is not ill-appearing.   HENT:      Head: Normocephalic and atraumatic.   Eyes:      Conjunctiva/sclera: Conjunctivae normal.   Cardiovascular:      Rate and Rhythm: Normal rate and regular rhythm.   Pulmonary:      Effort: Pulmonary effort is normal. No respiratory distress.      Breath sounds: No stridor. No wheezing.   Skin:         Neurological:      General: No focal deficit present.      Mental Status: He is alert and oriented to person, place, and time.   Psychiatric:         Mood and Affect: Mood normal.         Behavior: Behavior normal.         Thought Content: Thought content normal.         Judgment: Judgment normal.                           Assessment & Plan        1. Surgical wound infection    - cephALEXin (KEFLEX) 500 MG Cap; Take 1 Capsule by mouth 4 times a day for 7 days.  Dispense: 28 Capsule; Refill: 0  - mupirocin (BACTROBAN) 2 % Ointment; Apply 1 Application topically 2 times a day.  Dispense: 22 g; Refill: 0    2. Need for influenza vaccination    - Influenza Vaccine Quad Injection (PF)     Differential diagnoses, Supportive care, and indications for immediate follow-up discussed with patient.   Pathogenesis of diagnosis discussed including typical length and natural progression.   Instructed to return to clinic or nearest emergency department for any change in condition, further concerns, or worsening of symptoms.      The patient demonstrated a good understanding and agreed with the treatment plan.    Dayana Pacheco P.A.-C.

## 2023-01-31 ENCOUNTER — OFFICE VISIT (OUTPATIENT)
Dept: MEDICAL GROUP | Facility: PHYSICIAN GROUP | Age: 63
End: 2023-01-31
Payer: MEDICARE

## 2023-01-31 VITALS
HEIGHT: 70 IN | HEART RATE: 90 BPM | TEMPERATURE: 97.5 F | DIASTOLIC BLOOD PRESSURE: 74 MMHG | SYSTOLIC BLOOD PRESSURE: 128 MMHG | BODY MASS INDEX: 26.2 KG/M2 | WEIGHT: 183 LBS | RESPIRATION RATE: 14 BRPM | OXYGEN SATURATION: 95 %

## 2023-01-31 DIAGNOSIS — Z12.5 ENCOUNTER FOR SCREENING FOR MALIGNANT NEOPLASM OF PROSTATE: ICD-10-CM

## 2023-01-31 DIAGNOSIS — Z00.00 WELL ADULT EXAM: ICD-10-CM

## 2023-01-31 DIAGNOSIS — M35.9 SYSTEMIC INVOLVEMENT OF CONNECTIVE TISSUE, UNSPECIFIED (HCC): ICD-10-CM

## 2023-01-31 PROCEDURE — 99396 PREV VISIT EST AGE 40-64: CPT | Performed by: FAMILY MEDICINE

## 2023-01-31 RX ORDER — AMOXICILLIN 250 MG
1 CAPSULE ORAL
COMMUNITY
Start: 2022-09-04

## 2023-01-31 RX ORDER — ONDANSETRON 4 MG/1
4 TABLET, ORALLY DISINTEGRATING ORAL
COMMUNITY
Start: 2022-09-04

## 2023-01-31 ASSESSMENT — FIBROSIS 4 INDEX: FIB4 SCORE: 1.03

## 2023-01-31 ASSESSMENT — ENCOUNTER SYMPTOMS
CONSTITUTIONAL NEGATIVE: 1
NEUROLOGICAL NEGATIVE: 1
DEPRESSION: 0
PALPITATIONS: 0
GASTROINTESTINAL NEGATIVE: 1
DOUBLE VISION: 0
TINGLING: 0
HEMOPTYSIS: 0
BRUISES/BLEEDS EASILY: 0
CARDIOVASCULAR NEGATIVE: 1
BLURRED VISION: 0
PSYCHIATRIC NEGATIVE: 1
RESPIRATORY NEGATIVE: 1
EYES NEGATIVE: 1
NAUSEA: 0
HEARTBURN: 0
HEADACHES: 0
MYALGIAS: 0
MUSCULOSKELETAL NEGATIVE: 1
CHILLS: 0
FEVER: 0
DIZZINESS: 0
COUGH: 0

## 2023-01-31 ASSESSMENT — PATIENT HEALTH QUESTIONNAIRE - PHQ9: CLINICAL INTERPRETATION OF PHQ2 SCORE: 0

## 2023-01-31 NOTE — PROGRESS NOTES
Subjective     Victor Manuel Wagner is a 62 y.o. male who presents with Annual Exam (Yearly physical)            1. Well adult exam  This patient was here for a preventative medicine visit today. The Health Maintenance issues that are appropriate for this patient's age and sex were discussed and any that they agreed to were ordered. The patient was also give age and sex appropriate counseling for preventative matters such as diet, exercise, medication usage, and social determinants.       Lipid Profile; Future   Comp Metabolic Panel; Future   PROSTATE SPECIFIC AG SCREENING; Future    2. Encounter for screening for malignant neoplasm of prostate     PROSTATE SPECIFIC AG SCREENING; Future    3. Systemic involvement of connective tissue, unspecified (HCC)  No further episodes of his cerebritis and doing well  HCC Gap Form    Diagnosis to address: M35.9 - Systemic involvement of connective tissue,   unspecified (HCC)  Assessment and plan: Chronic, stable. Continue with current defined   treatment plan: The patient's current medical issue is well controlled on   the current therapy with no new symptoms or worsening  . Follow-up at least annually.  Last edited 01/31/23 10:03 PST by Mio Mcdonald M.D.         Past Medical History:  No date: Dislocation of left shoulder joint  11/18/2017: HLD (hyperlipidemia)  Past Surgical History:  11/25/2020: IR RECOVERY ROOM      Comment:  Procedure: IR RECOVERY ROOM;  Surgeon: Ir-Recovery                Surgery;  Location: SURGERY [place];  Service: Recovery  12/31/2015: COLONOSCOPY - ENDO      Comment:  Procedure: COLONOSCOPY - ENDO;  Surgeon: Jordan Mancuso M.D.;  Location: [facility];  Service:  1/13/2015: INGUINAL HERNIA LAPAROSCOPIC BILATERAL      Comment:  Performed by Danielito Byrne M.D. at SURGERY [facility]  2005: FOOT SURGERY      Comment:  cleaned joint out  Social History    Tobacco Use      Smoking status: Former        Years: 10.00        Types:  Cigarettes        Quit date: 1/1/2008        Years since quitting: 15.0      Smokeless tobacco: Never    Alcohol use: No      Alcohol/week: 4.0 oz      Types: 8 Cans of beer per week    Drug use: No    Review of patient's family history indicates:      Current Outpatient Medications: ·  ondansetron (ZOFRAN ODT) 4 MG TABLET DISPERSIBLE, Take 4 mg by mouth. (Patient not taking: Reported on 1/31/2023), Disp: , Rfl: ·  senna-docusate (PERICOLACE OR SENOKOT S) 8.6-50 MG Tab, Take 1 Tablet by mouth. (Patient not taking: Reported on 1/31/2023), Disp: , Rfl: ·  mupirocin (BACTROBAN) 2 % Ointment, Apply 1 Application topically 2 times a day. (Patient not taking: Reported on 1/31/2023), Disp: 22 g, Rfl: 0    Patient was instructed on the use of medications, either prescriptions or OTC and informed on when the appropriate follow up time period should be. In addition, patient was also instructed that should any acute worsening occur that they should notify this clinic asap or call 911.            Review of Systems   Constitutional: Negative.  Negative for chills and fever.   HENT: Negative.  Negative for hearing loss.    Eyes: Negative.  Negative for blurred vision and double vision.   Respiratory: Negative.  Negative for cough and hemoptysis.    Cardiovascular: Negative.  Negative for chest pain and palpitations.   Gastrointestinal: Negative.  Negative for heartburn and nausea.   Genitourinary: Negative.  Negative for dysuria.   Musculoskeletal: Negative.  Negative for myalgias.   Skin: Negative.  Negative for rash.   Neurological: Negative.  Negative for dizziness, tingling and headaches.   Endo/Heme/Allergies: Negative.  Does not bruise/bleed easily.   Psychiatric/Behavioral: Negative.  Negative for depression and suicidal ideas.    All other systems reviewed and are negative.           Objective     /74 (BP Location: Left arm, Patient Position: Sitting, BP Cuff Size: Adult long)   Pulse 90   Temp 36.4 °C (97.5 °F)  "(Temporal)   Resp 14   Ht 1.778 m (5' 10\")   Wt 83 kg (183 lb)   SpO2 95%   BMI 26.26 kg/m²      Physical Exam  Vitals and nursing note reviewed.   Constitutional:       General: He is not in acute distress.     Appearance: He is well-developed. He is not diaphoretic.   HENT:      Head: Normocephalic and atraumatic.      Mouth/Throat:      Pharynx: No oropharyngeal exudate.   Eyes:      Pupils: Pupils are equal, round, and reactive to light.   Cardiovascular:      Rate and Rhythm: Normal rate and regular rhythm.      Heart sounds: Normal heart sounds. No murmur heard.    No friction rub. No gallop.   Pulmonary:      Effort: Pulmonary effort is normal. No respiratory distress.      Breath sounds: Normal breath sounds. No wheezing or rales.   Chest:      Chest wall: No tenderness.   Neurological:      Mental Status: He is alert and oriented to person, place, and time.   Psychiatric:         Behavior: Behavior normal.         Thought Content: Thought content normal.         Judgment: Judgment normal.                           Assessment & Plan        1. Well adult exam    - Lipid Profile; Future  - Comp Metabolic Panel; Future  - PROSTATE SPECIFIC AG SCREENING; Future    2. Encounter for screening for malignant neoplasm of prostate    - PROSTATE SPECIFIC AG SCREENING; Future    3. Systemic involvement of connective tissue, unspecified (HCC)                    "

## 2023-02-06 ENCOUNTER — PATIENT MESSAGE (OUTPATIENT)
Dept: MEDICAL GROUP | Facility: PHYSICIAN GROUP | Age: 63
End: 2023-02-06
Payer: MEDICARE

## 2023-03-24 NOTE — PROGRESS NOTES
"Over 50% of this 25 minute visit was spent on counseling and coordination of care for the problem of  1. Cerebritis  Discussed his disease today with him  Still with symptoms of weakness and fatigue and \"foggy brain\"  Has appt with [facility] this month with their neurologist to see if any experimental treatments would be of benefit for him  Recent MRI with no change and no diagnostic clues  Still with cerebritis autoimmune vs viral as his dx    Past Medical History:   Diagnosis Date   • Dislocation of left shoulder joint    • HLD (hyperlipidemia) 11/18/2017     Past Surgical History:   Procedure Laterality Date   • COLONOSCOPY - ENDO  12/31/2015    Procedure: COLONOSCOPY - ENDO;  Surgeon: Jordan Mancuso M.D.;  Location: SURGERY [facility];  Service:    • INGUINAL HERNIA LAPAROSCOPIC BILATERAL  1/13/2015    Performed by Danielito Byrne M.D. at SURGERY [facility]   • FOOT SURGERY  2005    cleaned joint out     Social History   Substance Use Topics   • Smoking status: Former Smoker     Years: 10.00     Types: Cigarettes     Quit date: 1/1/2008   • Smokeless tobacco: Never Used   • Alcohol use No     Family History   Problem Relation Age of Onset   • Heart Attack Paternal Grandmother          Current Outpatient Prescriptions:   •  MethylPREDNISolone (MEDROL DOSEPAK) 4 MG Tablet Therapy Pack, As directed on the packaging label., Disp: 21 Tab, Rfl: 0    Patient was instructed on the use of medications, either prescriptions or OTC and informed on when the appropriate follow up time period should be. In addition, patient was also instructed that should any acute worsening occur that they should notify this clinic asap or call 911.      " Minocycline Counseling: Patient advised regarding possible photosensitivity and discoloration of the teeth, skin, lips, tongue and gums.  Patient instructed to avoid sunlight, if possible.  When exposed to sunlight, patients should wear protective clothing, sunglasses, and sunscreen.  The patient was instructed to call the office immediately if the following severe adverse effects occur:  hearing changes, easy bruising/bleeding, severe headache, or vision changes.  The patient verbalized understanding of the proper use and possible adverse effects of minocycline.  All of the patient's questions and concerns were addressed.

## 2023-03-28 PROBLEM — E66.3 OVERWEIGHT WITH BODY MASS INDEX (BMI) OF 27 TO 27.9 IN ADULT: Status: ACTIVE | Noted: 2023-03-28

## 2024-01-31 ENCOUNTER — OFFICE VISIT (OUTPATIENT)
Dept: MEDICAL GROUP | Facility: PHYSICIAN GROUP | Age: 64
End: 2024-01-31
Payer: MEDICARE

## 2024-01-31 VITALS
DIASTOLIC BLOOD PRESSURE: 80 MMHG | WEIGHT: 184 LBS | TEMPERATURE: 97 F | SYSTOLIC BLOOD PRESSURE: 116 MMHG | HEIGHT: 68 IN | OXYGEN SATURATION: 94 % | HEART RATE: 90 BPM | BODY MASS INDEX: 27.89 KG/M2

## 2024-01-31 DIAGNOSIS — Z12.5 ENCOUNTER FOR SCREENING FOR MALIGNANT NEOPLASM OF PROSTATE: ICD-10-CM

## 2024-01-31 DIAGNOSIS — Z00.00 MEDICARE ANNUAL WELLNESS VISIT, SUBSEQUENT: ICD-10-CM

## 2024-01-31 PROCEDURE — 3074F SYST BP LT 130 MM HG: CPT | Performed by: FAMILY MEDICINE

## 2024-01-31 PROCEDURE — 3079F DIAST BP 80-89 MM HG: CPT | Performed by: FAMILY MEDICINE

## 2024-01-31 PROCEDURE — G0439 PPPS, SUBSEQ VISIT: HCPCS | Performed by: FAMILY MEDICINE

## 2024-01-31 ASSESSMENT — ENCOUNTER SYMPTOMS: GENERAL WELL-BEING: FAIR

## 2024-01-31 ASSESSMENT — PATIENT HEALTH QUESTIONNAIRE - PHQ9: CLINICAL INTERPRETATION OF PHQ2 SCORE: 0

## 2024-01-31 ASSESSMENT — FIBROSIS 4 INDEX: FIB4 SCORE: 1.05

## 2024-01-31 ASSESSMENT — ACTIVITIES OF DAILY LIVING (ADL): BATHING_REQUIRES_ASSISTANCE: 0

## 2024-01-31 NOTE — PROGRESS NOTES
Chief Complaint   Patient presents with    Medicare Annual Wellness       HPI:  Danielito Wagner is a 63 y.o. here for Medicare Annual Wellness Visit     Patient Active Problem List    Diagnosis Date Noted    Overweight with body mass index (BMI) of 27 to 27.9 in adult 03/28/2023    Systemic involvement of connective tissue, unspecified (HCC) 01/31/2023    Overweight with body mass index (BMI) of 26 to 26.9 in adult 06/14/2022    BMI 27.0-27.9,adult 06/14/2022    Cerebritis 04/25/2018    HLD (hyperlipidemia) 11/18/2017    Shoulder dislocation 01/07/2015       Current Outpatient Medications   Medication Sig Dispense Refill    Omega-3 Fatty Acids (FISH OIL PO) Take  by mouth.      VITAMIN D PO Take  by mouth. (Patient not taking: Reported on 1/31/2024)      ondansetron (ZOFRAN ODT) 4 MG TABLET DISPERSIBLE Take 4 mg by mouth. (Patient not taking: Reported on 1/31/2023)      senna-docusate (PERICOLACE OR SENOKOT S) 8.6-50 MG Tab Take 1 Tablet by mouth. (Patient not taking: Reported on 1/31/2023)      mupirocin (BACTROBAN) 2 % Ointment Apply 1 Application topically 2 times a day. (Patient not taking: Reported on 1/31/2023) 22 g 0     No current facility-administered medications for this visit.          Current supplements as per medication list.     Allergies: Iodine    Current social contact/activities:      He  reports that he quit smoking about 16 years ago. His smoking use included cigarettes. He started smoking about 26 years ago. He has never used smokeless tobacco. He reports that he does not drink alcohol and does not use drugs.  Counseling given: Not Answered      ROS:    Gait: Uses no assistive device  Ostomy: No  Other tubes: No  Amputations: No  Chronic oxygen use: No  Last eye exam: 3/2023  Wears hearing aids: No   : Denies any urinary leakage during the last 6 months    Screening:    Depression Screening  Little interest or pleasure in doing things?  0 - not at all  Feeling down, depressed , or  hopeless? 0 - not at all  Patient Health Questionnaire Score: 0     If depressive symptoms identified deferred to follow up visit unless specifically addressed in assessment and plan.    Interpretation of PHQ-9 Total Score   Score Severity   1-4 No Depression   5-9 Mild Depression   10-14 Moderate Depression   15-19 Moderately Severe Depression   20-27 Severe Depression    Screening for Cognitive Impairment  Do you or any of your friends or family members have any concern about your memory? No  Three Minute Recall (Banana, Sunrise, Chair) 3/3    Gualberto clock face with all 12 numbers and set the hands to show 20 past 8.  Yes    Cognitive concerns identified deferred for follow up unless specifically addressed in assessment and plan.    Fall Risk Assessment  Has the patient had two or more falls in the last year or any fall with injury in the last year?  No    Safety Assessment  Do you always wear your seatbelt?  Yes  Any changes to home needed to function safely? No  Difficulty hearing.  No  Patient counseled about all safety risks that were identified.    Functional Assessment ADLs  Are there any barriers preventing you from cooking for yourself or meeting nutritional needs?  No.    Are there any barriers preventing you from driving safely or obtaining transportation?  No.    Are there any barriers preventing you from using a telephone or calling for help?  No    Are there any barriers preventing you from shopping?  No.    Are there any barriers preventing you from taking care of your own finances?  No    Are there any barriers preventing you from managing your medications?  No    Are there any barriers preventing you from showering, bathing or dressing yourself? No    Are there any barriers preventing you from doing housework or laundry? No  Are there any barriers preventing you from using the toilet?No  Are you currently engaging in any exercise or physical activity?  Yes.      Self-Assessment of Health  What is  your perception of your health? Fair  Do you sleep more than six hours a night? Yes  In the past 7 days, how much did pain keep you from doing your normal work? Some  Do you spend quality time with family or friends (virtually or in person)? Yes  Do you usually eat a heart healthy diet that constists of a variety of fruits, vegetables, whole grains and fiber? Yes  Do you eat foods high in fat and/or Fast Food more than three times per week? No    Advance Care Planning  Do you have an Advance Directive, Living Will, Durable Power of , or POLST? No                 Health Maintenance Summary            Overdue - HIV Screening (Once) Overdue - never done      No completion history exists for this topic.              Overdue - COVID-19 Vaccine (4 - 2023-24 season) Overdue since 9/1/2023      10/14/2021  Imm Admin: PFIZER PURPLE CAP SARS-COV-2 VACCINATION (12+)    06/02/2021  Imm Admin: PFIZER PURPLE CAP SARS-COV-2 VACCINATION (12+)    03/02/2021  Imm Admin: PFIZER PURPLE CAP SARS-COV-2 VACCINATION (12+)              Postponed - Influenza Vaccine (1) Postponed until 1/31/2025      10/28/2022  Imm Admin: Influenza Vaccine-Flucelvax - HISTORICAL DATA    10/08/2022  Imm Admin: Influenza Vaccine Quad Inj (Pf)    10/26/2019  Imm Admin: Influenza Vaccine Quad Inj (Pf)    11/03/2018  Imm Admin: Influenza Vac Subunit Quad Inj (Pf)    10/27/2015  Imm Admin: INFLUENZA TIV (IM)              Annual Wellness Visit (Yearly) Next due on 3/28/2024      03/28/2023  Level of Service: NC ANNUAL WELLNESS VISIT-INCLUDES PPPS SUBSEQUE*    06/14/2022  Level of Service: NC ANNUAL WELLNESS VISIT-INCLUDES PPPS SUBSEQUE*    01/31/2022  Done    01/31/2022  Visit Dx: Medicare annual wellness visit, subsequent              IMM DTaP/Tdap/Td Vaccine (2 - Td or Tdap) Next due on 1/23/2026 01/23/2016  Imm Admin: Tdap Vaccine              Colorectal Cancer Screening (Colonoscopy - Every 7 Years) Tentatively due on 12/5/2029 12/05/2022   AMB EXTERNAL COLONOSCOPY RESULTS    2022  AMB EXTERNAL COLONOSCOPY RESULTS    2015  Surgical Procedure: COLONOSCOPY - ENDO    2015  Colonoscopy (Done)              Zoster (Shingles) Vaccines (Series Information) Completed      2023  Imm Admin: Zoster Vaccine Recombinant (RZV) (SHINGRIX)    2020  Imm Admin: Zoster Vaccine Recombinant (RZV) (SHINGRIX)              Hepatitis A Vaccine (Hep A) (Series Information) Aged Out      No completion history exists for this topic.              Hepatitis B Vaccine (Hep B) (Series Information) Aged Out      No completion history exists for this topic.              HPV Vaccines (Series Information) Aged Out      No completion history exists for this topic.              Polio Vaccine (Inactivated Polio) (Series Information) Aged Out      No completion history exists for this topic.              Meningococcal Immunization (Series Information) Aged Out      No completion history exists for this topic.              Pneumococcal Vaccine: 0-64 Years (Series Information) Aged Out      No completion history exists for this topic.              Discontinued - Hepatitis C Screening  Discontinued      No completion history exists for this topic.                    Patient Care Team:  Mio Mcdonald M.D. as PCP - General (Family Medicine)  Mio Mcdonald M.D. as PCP - [facility] Paneled        Social History     Tobacco Use    Smoking status: Former     Current packs/day: 0.00     Types: Cigarettes     Start date: 1998     Quit date: 2008     Years since quittin.0    Smokeless tobacco: Never   Vaping Use    Vaping Use: Never used   Substance Use Topics    Alcohol use: No     Alcohol/week: 4.0 oz     Types: 8 Cans of beer per week    Drug use: No     Family History   Problem Relation Age of Onset    Heart Attack Paternal Grandmother      He  has a past medical history of Dislocation of left shoulder joint and HLD (hyperlipidemia) (2017).    He has no  past medical history of Addisons disease (HCC), Adrenal disorder (HCC), Allergy, unspecified not elsewhere classified, Anemia, Anesthesia, Anxiety, Arrhythmia, Arthritis, Asthma, Asymptomatic human immunodeficiency virus (HIV) infection status (HCC), Blood transfusion, without reported diagnosis, Breath shortness, Cancer (HCC), CHF (congestive heart failure) (HCC), Chronic airway obstruction, not elsewhere classified, Clotting disorder (HCC), Cold, Congestive heart failure (HCC), Cushings syndrome (HCC), Dental disorder, Depression, Diabetic neuropathy (HCC), Emphysema, GERD (gastroesophageal reflux disease), Glaucoma, Goiter, Headache(784.0), Headache, classical migraine, Heart attack (HCC), Heart murmur, Hepatitis A, Hepatitis B, Hepatitis C, Hypertension, IBD (inflammatory bowel disease), Kidney disease, Meningitis, Muscle disorder, Myocardial infarct (HCC), Osteoporosis, unspecified, Other and unspecified angina pectoris, Pacemaker, Parathyroid disorder (HCC), Pituitary disease (HCC), Pneumonia, Psychiatric problem, Renal disorder, Rheumatic fever, Seizure (HCC), Sickle cell disease (HCC), Sleep apnea, Snoring, Stroke (HCC), Substance abuse (HCC), Thyroid disease, Tuberculosis, Type II or unspecified type diabetes mellitus without mention of complication, not stated as uncontrolled, Ulcer, Unspecified disorder of thyroid, Unspecified hemorrhagic conditions, Unspecified urinary incontinence, or Urinary tract infection, site not specified.   Past Surgical History:   Procedure Laterality Date    IR RECOVERY ROOM  11/25/2020    Procedure: IR RECOVERY ROOM;  Surgeon: Ir-Recovery Surgery;  Location: SURGERY [place];  Service: Recovery    COLONOSCOPY - ENDO  12/31/2015    Procedure: COLONOSCOPY - ENDO;  Surgeon: Jordan Mancuso M.D.;  Location: [facility];  Service:     INGUINAL HERNIA LAPAROSCOPIC BILATERAL  1/13/2015    Performed by Danielito Byrne M.D. at [facility]    FOOT SURGERY  2005     "cleaned joint out       Exam:   Pulse 90   Temp 36.1 °C (97 °F) (Temporal)   Ht 1.727 m (5' 8\")   Wt 83.5 kg (184 lb)   SpO2 94%  Body mass index is 27.98 kg/m².    Hearing good.    Dentition fair  Alert, oriented in no acute distress.  Eye contact is good, speech goal directed, affect calm    Assessment and Plan. The following treatment and monitoring plan is recommended:    1. Medicare annual wellness visit, subsequent  Utd on   - Lipid Profile; Future  - PROSTATE SPECIFIC AG SCREENING; Future  - Comp Metabolic Panel; Future    2. Encounter for screening for malignant neoplasm of prostate    - PROSTATE SPECIFIC AG SCREENING; Future    Past Medical History:   Diagnosis Date    Dislocation of left shoulder joint     HLD (hyperlipidemia) 2017     Past Surgical History:   Procedure Laterality Date    IR RECOVERY ROOM  2020    Procedure: IR RECOVERY ROOM;  Surgeon: Ir-Recovery Surgery;  Location: SURGERY [place];  Service: Recovery    COLONOSCOPY - ENDO  2015    Procedure: COLONOSCOPY - ENDO;  Surgeon: Jordan Mancuso M.D.;  Location: SURGERY [facility];  Service:     INGUINAL HERNIA LAPAROSCOPIC BILATERAL  2015    Performed by Danielito Byrne M.D. at SURGERY [place] [facility]    FOOT SURGERY      cleaned joint out       Social History     Tobacco Use    Smoking status: Former     Current packs/day: 0.00     Types: Cigarettes     Start date: 1998     Quit date: 2008     Years since quittin.0    Smokeless tobacco: Never   Vaping Use    Vaping Use: Never used   Substance Use Topics    Alcohol use: No     Alcohol/week: 4.0 oz     Types: 8 Cans of beer per week    Drug use: No       Family History   Problem Relation Age of Onset    Heart Attack Paternal Grandmother          Current Outpatient Medications:     Omega-3 Fatty Acids (FISH OIL PO), Take  by mouth., Disp: , Rfl:     VITAMIN D PO, Take  by mouth. (Patient not taking: Reported on 2024), Disp: , Rfl:     ondansetron " (ZOFRAN ODT) 4 MG TABLET DISPERSIBLE, Take 4 mg by mouth. (Patient not taking: Reported on 1/31/2023), Disp: , Rfl:     senna-docusate (PERICOLACE OR SENOKOT S) 8.6-50 MG Tab, Take 1 Tablet by mouth. (Patient not taking: Reported on 1/31/2023), Disp: , Rfl:     mupirocin (BACTROBAN) 2 % Ointment, Apply 1 Application topically 2 times a day. (Patient not taking: Reported on 1/31/2023), Disp: 22 g, Rfl: 0    Patient was instructed on the use of medications, either prescriptions or OTC and informed on when the appropriate follow up time period should be. In addition, patient was also instructed that should any acute worsening occur that they should notify this clinic asap or call 911.        Services suggested: No services needed at this time  Health Care Screening: Age-appropriate preventive services recommended by USPTF and ACIP covered by Medicare were discussed today. Services ordered if indicated and agreed upon by the patient.  Referrals offered: Community-based lifestyle interventions to reduce health risks and promote self-management and wellness, fall prevention, nutrition, physical activity, tobacco-use cessation, weight loss, and mental health services as per orders if indicated.    Discussion today about general wellness and lifestyle habits:    Prevent falls and reduce trip hazards; Cautioned about securing or removing rugs.  Have a working fire alarm and carbon monoxide detector;   Engage in regular physical activity and social activities     Follow-up: No follow-ups on file.

## 2024-03-04 NOTE — ADDENDUM NOTE
Addended by: MANUEL FRANKLIN on: 10/31/2018 11:32 AM     Modules accepted: Orders    
Detail Level: Detailed
Detail Level: Zone

## 2024-03-19 ENCOUNTER — TELEPHONE (OUTPATIENT)
Dept: HEALTH INFORMATION MANAGEMENT | Facility: OTHER | Age: 64
End: 2024-03-19
Payer: MEDICARE

## 2025-02-24 ENCOUNTER — APPOINTMENT (OUTPATIENT)
Dept: MEDICAL GROUP | Facility: PHYSICIAN GROUP | Age: 65
End: 2025-02-24
Payer: MEDICARE

## 2025-02-24 VITALS
TEMPERATURE: 97.7 F | OXYGEN SATURATION: 97 % | BODY MASS INDEX: 26.03 KG/M2 | SYSTOLIC BLOOD PRESSURE: 130 MMHG | HEIGHT: 70 IN | DIASTOLIC BLOOD PRESSURE: 72 MMHG | RESPIRATION RATE: 20 BRPM | WEIGHT: 181.8 LBS | HEART RATE: 97 BPM

## 2025-02-24 DIAGNOSIS — Z12.5 ENCOUNTER FOR SCREENING FOR MALIGNANT NEOPLASM OF PROSTATE: ICD-10-CM

## 2025-02-24 DIAGNOSIS — Z00.00 MEDICARE ANNUAL WELLNESS VISIT, SUBSEQUENT: ICD-10-CM

## 2025-02-24 PROCEDURE — 3078F DIAST BP <80 MM HG: CPT | Performed by: FAMILY MEDICINE

## 2025-02-24 PROCEDURE — 3075F SYST BP GE 130 - 139MM HG: CPT | Performed by: FAMILY MEDICINE

## 2025-02-24 PROCEDURE — G0439 PPPS, SUBSEQ VISIT: HCPCS | Performed by: FAMILY MEDICINE

## 2025-02-24 RX ORDER — TADALAFIL 10 MG/1
10 TABLET ORAL
Qty: 10 TABLET | Refills: 3 | Status: SHIPPED | OUTPATIENT
Start: 2025-02-24

## 2025-02-24 ASSESSMENT — PATIENT HEALTH QUESTIONNAIRE - PHQ9: CLINICAL INTERPRETATION OF PHQ2 SCORE: 0

## 2025-02-24 ASSESSMENT — ACTIVITIES OF DAILY LIVING (ADL): BATHING_REQUIRES_ASSISTANCE: 0

## 2025-02-24 ASSESSMENT — ENCOUNTER SYMPTOMS: GENERAL WELL-BEING: FAIR

## 2025-02-24 NOTE — PROGRESS NOTES
Chief Complaint   Patient presents with    Annual Wellness Visit       HPI:  Danielito Wagner is a 64 y.o. here for Medicare Annual Wellness Visit     Patient Active Problem List    Diagnosis Date Noted    Overweight with body mass index (BMI) of 27 to 27.9 in adult 03/28/2023    Overweight with body mass index (BMI) of 26 to 26.9 in adult 06/14/2022    BMI 27.0-27.9,adult 06/14/2022    Cerebritis 04/25/2018    HLD (hyperlipidemia) 11/18/2017    Shoulder dislocation 01/07/2015       Current Outpatient Medications   Medication Sig Dispense Refill    Omega-3 Fatty Acids (FISH OIL PO) Take  by mouth.      VITAMIN D PO Take  by mouth. (Patient not taking: Reported on 2/24/2025)      ondansetron (ZOFRAN ODT) 4 MG TABLET DISPERSIBLE Take 4 mg by mouth. (Patient not taking: Reported on 2/24/2025)      senna-docusate (PERICOLACE OR SENOKOT S) 8.6-50 MG Tab Take 1 Tablet by mouth. (Patient not taking: Reported on 2/24/2025)      mupirocin (BACTROBAN) 2 % Ointment Apply 1 Application topically 2 times a day. (Patient not taking: Reported on 2/24/2025) 22 g 0     No current facility-administered medications for this visit.          Current supplements as per medication list.     Allergies: Iodine    Current social contact/activities:      He  reports that he quit smoking about 17 years ago. His smoking use included cigarettes. He started smoking about 27 years ago. He has never used smokeless tobacco. He reports that he does not drink alcohol and does not use drugs.  Counseling given: Not Answered      ROS:    Gait: Uses no assistive device  Ostomy: No  Other tubes: No  Amputations: No  Chronic oxygen use: No  Last eye exam: 2024  Wears hearing aids: No   : Denies any urinary leakage during the last 6 months    Screening:    Depression Screening  Little interest or pleasure in doing things?  0 - not at all  Feeling down, depressed , or hopeless? 0 - not at all  Patient Health Questionnaire Score: 0     If depressive  symptoms identified deferred to follow up visit unless specifically addressed in assessment and plan.    Interpretation of PHQ-9 Total Score   Score Severity   1-4 No Depression   5-9 Mild Depression   10-14 Moderate Depression   15-19 Moderately Severe Depression   20-27 Severe Depression    Screening for Cognitive Impairment  Do you or any of your friends or family members have any concern about your memory? No  Three Minute Recall (Village, Kitchen, Baby) 2/3    Gualberto clock face with all 12 numbers and set the hands to show 10 minutes past 11.  Yes    Cognitive concerns identified deferred for follow up unless specifically addressed in assessment and plan.    Fall Risk Assessment  Has the patient had two or more falls in the last year or any fall with injury in the last year?  No    Safety Assessment  Do you always wear your seatbelt?  Yes  Any changes to home needed to function safely? No  Difficulty hearing.  No  Patient counseled about all safety risks that were identified.    Functional Assessment ADLs  Are there any barriers preventing you from cooking for yourself or meeting nutritional needs?  No.    Are there any barriers preventing you from driving safely or obtaining transportation?  No.    Are there any barriers preventing you from using a telephone or calling for help?  No    Are there any barriers preventing you from shopping?  No.    Are there any barriers preventing you from taking care of your own finances?  No    Are there any barriers preventing you from managing your medications?  No    Are there any barriers preventing you from showering, bathing or dressing yourself? No    Are there any barriers preventing you from doing housework or laundry? No  Are there any barriers preventing you from using the toilet?No  Are you currently engaging in any exercise or physical activity?  Yes. Walk a little     Self-Assessment of Health  What is your perception of your health? Fair    Do you sleep more than  six hours a night? Yes    In the past 7 days, how much did pain keep you from doing your normal work? None    Do you spend quality time with family or friends (virtually or in person)? Yes    Do you usually eat a heart healthy diet that constists of a variety of fruits, vegetables, whole grains and fiber? Yes    Do you eat foods high in fat and/or Fast Food more than three times per week? No    How concerned are you that your medical conditions are not being well managed? Not at all    Are you worried that in the next 2 months, you may not have stable housing that you own, rent, or stay in as part of a household? No      Advance Care Planning  Do you have an Advance Directive, Living Will, Durable Power of , or POLST? Yes    Living Will     is not on file - instructed patient to bring in a copy to scan into their chart      Health Maintenance Summary            Current Care Gaps       Annual Wellness Visit (Yearly) Overdue since 1/31/2025 01/31/2024  Visit Dx: Medicare annual wellness visit, subsequent    03/28/2023  Level of Service: DC ANNUAL WELLNESS VISIT-INCLUDES PPPS SUBSEQUE*    06/14/2022  Level of Service: DC ANNUAL WELLNESS VISIT-INCLUDES PPPS SUBSEQUE*    01/31/2022  Visit Dx: Medicare annual wellness visit, subsequent    01/31/2022  Done     Only the first 5 history entries have been loaded, but more history exists.                    Needs Review       Colorectal Cancer Screening (Colonoscopy - Every 7 Years) Tentatively due on 12/5/2029 12/05/2022  AMB EXTERNAL COLONOSCOPY RESULTS    12/02/2022  AMB EXTERNAL COLONOSCOPY RESULTS    12/31/2015  Colonoscopy (Done)    12/31/2015  Surgical Procedure: COLONOSCOPY - ENDO                      Upcoming       Influenza Vaccine (1) Postponed until 2/24/2026      10/28/2022  Imm Admin: Influenza Vaccine-Flucelvax - HISTORICAL DATA    10/08/2022  Imm Admin: Influenza Vaccine Quad Inj (Pf)    10/26/2019  Imm Admin: Influenza Vaccine Quad Inj (Pf)     11/03/2018  Imm Admin: Influenza Vac Subunit Quad Inj (Pf)    10/27/2015  Imm Admin: INFLUENZA TIV (IM)      Only the first 5 history entries have been loaded, but more history exists.              COVID-19 Vaccine (4 - 2024-25 season) Postponed until 2/24/2026      10/14/2021  Imm Admin: PFIZER PURPLE CAP SARS-COV-2 VACCINATION (12+)    06/02/2021  Imm Admin: PFIZER PURPLE CAP SARS-COV-2 VACCINATION (12+)    03/02/2021  Imm Admin: PFIZER PURPLE CAP SARS-COV-2 VACCINATION (12+)              Pneumococcal Vaccine: 50+ Years (1 of 1 - PCV) Postponed until 2/24/2026      No completion history exists for this topic.              IMM DTaP/Tdap/Td Vaccine (2 - Td or Tdap) Next due on 1/23/2026 01/23/2016  Imm Admin: Tdap Vaccine                      Completed or No Longer Recommended       Zoster (Shingles) Vaccines (Series Information) Completed      01/06/2023  Imm Admin: Zoster Vaccine Recombinant (RZV) (SHINGRIX)    11/19/2020  Imm Admin: Zoster Vaccine Recombinant (RZV) (SHINGRIX)              Hepatitis A Vaccine (Hep A) (Series Information) Aged Out     No completion history exists for this topic.              Hepatitis B Vaccine (Hep B) (Series Information) Aged Out     No completion history exists for this topic.              HPV Vaccines (Series Information) Aged Out     No completion history exists for this topic.              Polio Vaccine (Inactivated Polio) (Series Information) Aged Out     No completion history exists for this topic.              Meningococcal Immunization (Series Information) Aged Out     No completion history exists for this topic.              HIV Screening  Discontinued     No completion history exists for this topic.              Hepatitis C Screening  Discontinued     No completion history exists for this topic.                            Patient Care Team:  Mio Mcdonald M.D. as PCP - General (Family Medicine)  Mio Mcdonald M.D. as PCP - [facility] Paneled        Social History      Tobacco Use    Smoking status: Former     Current packs/day: 0.00     Types: Cigarettes     Start date: 1998     Quit date: 2008     Years since quittin.1    Smokeless tobacco: Never   Vaping Use    Vaping status: Never Used   Substance Use Topics    Alcohol use: No     Alcohol/week: 4.0 oz     Types: 8 Cans of beer per week    Drug use: No     Family History   Problem Relation Age of Onset    Heart Attack Paternal Grandmother      He  has a past medical history of Dislocation of left shoulder joint and HLD (hyperlipidemia) (2017).    He has no past medical history of Addisons disease ([facility]), Adrenal disorder (HCC), Allergy, unspecified not elsewhere classified, Anemia, Anesthesia, Anxiety, Arrhythmia, Arthritis, Asthma, Asymptomatic human immunodeficiency virus (HIV) infection status ([facility]), Blood transfusion, without reported diagnosis, Breath shortness, Cancer ([facility]), CHF (congestive heart failure) ([facility]), Chronic airway obstruction, not elsewhere classified, Clotting disorder ([facility]), Cold, Congestive heart failure ([facility]), Cushings syndrome ([facility]), Dental disorder, Depression, Diabetic neuropathy ([facility]), Emphysema, GERD (gastroesophageal reflux disease), Glaucoma, Goiter, Headache(784.0), Headache, classical migraine, Heart attack ([facility]), Heart murmur, Hepatitis A, Hepatitis B, Hepatitis C, Hypertension, IBD (inflammatory bowel disease), Kidney disease, Meningitis, Muscle disorder, Myocardial infarct (HCC), Osteoporosis, unspecified, Other and unspecified angina pectoris, Pacemaker, Parathyroid disorder (HCC), Pituitary disease ([facility]), Pneumonia, Psychiatric problem, Renal disorder, Rheumatic fever, Seizure ([facility]), Sickle cell disease ([facility]), Sleep apnea, Snoring, Stroke ([facility]), Substance abuse (HCC), Thyroid disease, Tuberculosis, Type II or unspecified type diabetes mellitus without mention of complication, not stated as uncontrolled, Ulcer, Unspecified disorder of thyroid, Unspecified hemorrhagic  "conditions, Unspecified urinary incontinence, or Urinary tract infection, site not specified.   Past Surgical History:   Procedure Laterality Date    IR RECOVERY ROOM  11/25/2020    Procedure: IR RECOVERY ROOM;  Surgeon: Ir-Recovery Surgery;  Location: SURGERY [place];  Service: Recovery    COLONOSCOPY - ENDO  12/31/2015    Procedure: COLONOSCOPY - ENDO;  Surgeon: Jordan Mancuso M.D.;  Location: [facility];  Service:     INGUINAL HERNIA LAPAROSCOPIC BILATERAL  1/13/2015    Performed by Danielito Byrne M.D. at [facility]    FOOT SURGERY  2005    cleaned joint out       Exam:   Pulse 97   Temp 36.5 °C (97.7 °F) (Temporal)   Resp 20   Ht 1.778 m (5' 10\")   Wt 82.5 kg (181 lb 12.8 oz)   SpO2 97%  Body mass index is 26.09 kg/m².    Hearing good.    Dentition good  Alert, oriented in no acute distress.  Eye contact is good, speech goal directed, affect calm    Assessment and Plan. The following treatment and monitoring plan is recommended:    1. Medicare annual wellness visit, subsequent  Utd on hm  - HEMOGLOBIN A1C; Future  - Lipid Profile; Future  - FREE THYROXINE; Future  - CBC WITHOUT DIFFERENTIAL; Future  - PROSTATE SPECIFIC AG SCREENING; Future  - Comp Metabolic Panel; Future    2. Encounter for screening for malignant neoplasm of prostate    - PROSTATE SPECIFIC AG SCREENING; Future    Past Medical History:   Diagnosis Date    Dislocation of left shoulder joint     HLD (hyperlipidemia) 11/18/2017     Past Surgical History:   Procedure Laterality Date    IR RECOVERY ROOM  11/25/2020    Procedure: IR RECOVERY ROOM;  Surgeon: Ir-Recovery Surgery;  Location: SURGERY [place];  Service: Recovery    COLONOSCOPY - ENDO  12/31/2015    Procedure: COLONOSCOPY - ENDO;  Surgeon: Jordan Mancuso M.D.;  Location: SURGERY [facility];  Service:     INGUINAL HERNIA LAPAROSCOPIC BILATERAL  1/13/2015    Performed by Danielito Byrne M.D. at [facility]    FOOT SURGERY  2005    cleaned joint out "       Social History     Tobacco Use    Smoking status: Former     Current packs/day: 0.00     Types: Cigarettes     Start date: 1998     Quit date: 2008     Years since quittin.1    Smokeless tobacco: Never   Vaping Use    Vaping status: Never Used   Substance Use Topics    Alcohol use: No     Alcohol/week: 4.0 oz     Types: 8 Cans of beer per week    Drug use: No       Family History   Problem Relation Age of Onset    Heart Attack Paternal Grandmother          Current Outpatient Medications:     Omega-3 Fatty Acids (FISH OIL PO), Take  by mouth., Disp: , Rfl:     VITAMIN D PO, Take  by mouth. (Patient not taking: Reported on 2025), Disp: , Rfl:     ondansetron (ZOFRAN ODT) 4 MG TABLET DISPERSIBLE, Take 4 mg by mouth. (Patient not taking: Reported on 2025), Disp: , Rfl:     senna-docusate (PERICOLACE OR SENOKOT S) 8.6-50 MG Tab, Take 1 Tablet by mouth. (Patient not taking: Reported on 2025), Disp: , Rfl:     mupirocin (BACTROBAN) 2 % Ointment, Apply 1 Application topically 2 times a day. (Patient not taking: Reported on 2025), Disp: 22 g, Rfl: 0    Patient was instructed on the use of medications, either prescriptions or OTC and informed on when the appropriate follow up time period should be. In addition, patient was also instructed that should any acute worsening occur that they should notify this clinic asap or call 911.        Services suggested: No services needed at this time  Health Care Screening: Age-appropriate preventive services recommended by USPTF and ACIP covered by Medicare were discussed today. Services ordered if indicated and agreed upon by the patient.  Referrals offered: Community-based lifestyle interventions to reduce health risks and promote self-management and wellness, fall prevention, nutrition, physical activity, tobacco-use cessation, weight loss, and mental health services as per orders if indicated.    Discussion today about general wellness and  lifestyle habits:    Prevent falls and reduce trip hazards; Cautioned about securing or removing rugs.  Have a working fire alarm and carbon monoxide detector;   Engage in regular physical activity and social activities     Follow-up: No follow-ups on file.

## 2025-02-24 NOTE — PROGRESS NOTES
Expand All Collapse All      Chief Complaint   Patient presents with    Annual Wellness Visit         HPI:  Danielito Wagner is a 64 y.o. here for Medicare Annual Wellness Visit           Patient Active Problem List     Diagnosis Date Noted    Overweight with body mass index (BMI) of 27 to 27.9 in adult 03/28/2023    Overweight with body mass index (BMI) of 26 to 26.9 in adult 06/14/2022    BMI 27.0-27.9,adult 06/14/2022    Cerebritis 04/25/2018    HLD (hyperlipidemia) 11/18/2017    Shoulder dislocation 01/07/2015         Current Medications          Current Outpatient Medications   Medication Sig Dispense Refill    Omega-3 Fatty Acids (FISH OIL PO) Take  by mouth.        VITAMIN D PO Take  by mouth. (Patient not taking: Reported on 2/24/2025)        ondansetron (ZOFRAN ODT) 4 MG TABLET DISPERSIBLE Take 4 mg by mouth. (Patient not taking: Reported on 2/24/2025)        senna-docusate (PERICOLACE OR SENOKOT S) 8.6-50 MG Tab Take 1 Tablet by mouth. (Patient not taking: Reported on 2/24/2025)        mupirocin (BACTROBAN) 2 % Ointment Apply 1 Application topically 2 times a day. (Patient not taking: Reported on 2/24/2025) 22 g 0      No current facility-administered medications for this visit.             Current supplements as per medication list.      Allergies: Iodine     Current social contact/activities:       He  reports that he quit smoking about 17 years ago. His smoking use included cigarettes. He started smoking about 27 years ago. He has never used smokeless tobacco. He reports that he does not drink alcohol and does not use drugs.  Counseling given: Not Answered        ROS:    Gait: Uses no assistive device  Ostomy: No  Other tubes: No  Amputations: No  Chronic oxygen use: No  Last eye exam: 2024  Wears hearing aids: No   : Denies any urinary leakage during the last 6 months     Screening:     Depression Screening  Little interest or pleasure in doing things?  0 - not at all  Feeling down, depressed , or  hopeless? 0 - not at all  Patient Health Questionnaire Score: 0      If depressive symptoms identified deferred to follow up visit unless specifically addressed in assessment and plan.     Interpretation of PHQ-9 Total Score   Score Severity   1-4 No Depression   5-9 Mild Depression   10-14 Moderate Depression   15-19 Moderately Severe Depression   20-27 Severe Depression     Screening for Cognitive Impairment  Do you or any of your friends or family members have any concern about your memory? No  Three Minute Recall (Village, Kitchen, Baby) 2/3    Gualberto clock face with all 12 numbers and set the hands to show 10 minutes past 11.  Yes    Cognitive concerns identified deferred for follow up unless specifically addressed in assessment and plan.     Fall Risk Assessment  Has the patient had two or more falls in the last year or any fall with injury in the last year?  No     Safety Assessment  Do you always wear your seatbelt?  Yes  Any changes to home needed to function safely? No  Difficulty hearing.  No  Patient counseled about all safety risks that were identified.     Functional Assessment ADLs  Are there any barriers preventing you from cooking for yourself or meeting nutritional needs?  No.    Are there any barriers preventing you from driving safely or obtaining transportation?  No.    Are there any barriers preventing you from using a telephone or calling for help?  No    Are there any barriers preventing you from shopping?  No.    Are there any barriers preventing you from taking care of your own finances?  No    Are there any barriers preventing you from managing your medications?  No    Are there any barriers preventing you from showering, bathing or dressing yourself? No    Are there any barriers preventing you from doing housework or laundry? No  Are there any barriers preventing you from using the toilet?No  Are you currently engaging in any exercise or physical activity?  Yes. Walk a little       Self-Assessment of Health  What is your perception of your health? Fair    Do you sleep more than six hours a night? Yes    In the past 7 days, how much did pain keep you from doing your normal work? None    Do you spend quality time with family or friends (virtually or in person)? Yes    Do you usually eat a heart healthy diet that constists of a variety of fruits, vegetables, whole grains and fiber? Yes    Do you eat foods high in fat and/or Fast Food more than three times per week? No    How concerned are you that your medical conditions are not being well managed? Not at all    Are you worried that in the next 2 months, you may not have stable housing that you own, rent, or stay in as part of a household? No       Advance Care Planning  Do you have an Advance Directive, Living Will, Durable Power of , or POLST? Yes    Living Will     is not on file - instructed patient to bring in a copy to scan into their chart        Health Maintenance Summary              Current Care Gaps         Annual Wellness Visit (Yearly) Overdue since 1/31/2025 01/31/2024   Visit Dx: Medicare annual wellness visit, subsequent     03/28/2023   Level of Service: NM ANNUAL WELLNESS VISIT-INCLUDES PPPS SUBSEQUE*     06/14/2022   Level of Service: NM ANNUAL WELLNESS VISIT-INCLUDES PPPS SUBSEQUE*     01/31/2022   Visit Dx: Medicare annual wellness visit, subsequent     01/31/2022   Done      Only the first 5 history entries have been loaded, but more history exists.                        Needs Review         Colorectal Cancer Screening (Colonoscopy - Every 7 Years) Tentatively due on 12/5/2029 12/05/2022   AMB EXTERNAL COLONOSCOPY RESULTS     12/02/2022   AMB EXTERNAL COLONOSCOPY RESULTS     12/31/2015   Colonoscopy (Done)     12/31/2015   Surgical Procedure: COLONOSCOPY - ENDO                            Upcoming         Influenza Vaccine (1) Postponed until 2/24/2026        10/28/2022   Imm Admin: Influenza  Vaccine-Flucelvax - HISTORICAL DATA     10/08/2022   Imm Admin: Influenza Vaccine Quad Inj (Pf)     10/26/2019   Imm Admin: Influenza Vaccine Quad Inj (Pf)     11/03/2018   Imm Admin: Influenza Vac Subunit Quad Inj (Pf)     10/27/2015   Imm Admin: INFLUENZA TIV (IM)        Only the first 5 history entries have been loaded, but more history exists.                  COVID-19 Vaccine (4 - 2024-25 season) Postponed until 2/24/2026        10/14/2021   Imm Admin: PFIZER PURPLE CAP SARS-COV-2 VACCINATION (12+)     06/02/2021   Imm Admin: PFIZER PURPLE CAP SARS-COV-2 VACCINATION (12+)     03/02/2021   Imm Admin: PFIZER PURPLE CAP SARS-COV-2 VACCINATION (12+)                  Pneumococcal Vaccine: 50+ Years (1 of 1 - PCV) Postponed until 2/24/2026        No completion history exists for this topic.                  IMM DTaP/Tdap/Td Vaccine (2 - Td or Tdap) Next due on 1/23/2026 01/23/2016   Imm Admin: Tdap Vaccine                            Completed or No Longer Recommended         Zoster (Shingles) Vaccines (Series Information) Completed        01/06/2023   Imm Admin: Zoster Vaccine Recombinant (RZV) (SHINGRIX)     11/19/2020   Imm Admin: Zoster Vaccine Recombinant (RZV) (SHINGRIX)                  Hepatitis A Vaccine (Hep A) (Series Information) Aged Out      No completion history exists for this topic.                  Hepatitis B Vaccine (Hep B) (Series Information) Aged Out      No completion history exists for this topic.                  HPV Vaccines (Series Information) Aged Out      No completion history exists for this topic.                  Polio Vaccine (Inactivated Polio) (Series Information) Aged Out      No completion history exists for this topic.                  Meningococcal Immunization (Series Information) Aged Out      No completion history exists for this topic.                  HIV Screening  Discontinued      No completion history exists for this topic.                  Hepatitis C Screening   Discontinued      No completion history exists for this topic.                                    Patient Care Team:  Mio Mcdonald M.D. as PCP - General (Family Medicine)  Mio Mcdonald M.D. as PCP - [facility] Paneled           Social History   Social History            Tobacco Use    Smoking status: Former       Current packs/day: 0.00       Types: Cigarettes       Start date: 1998       Quit date: 2008       Years since quittin.1    Smokeless tobacco: Never   Vaping Use    Vaping status: Never Used   Substance Use Topics    Alcohol use: No       Alcohol/week: 4.0 oz       Types: 8 Cans of beer per week    Drug use: No         Family History         Family History   Problem Relation Age of Onset    Heart Attack Paternal Grandmother           He  has a past medical history of Dislocation of left shoulder joint and HLD (hyperlipidemia) (2017).     He has no past medical history of Addisons disease (HCC), Adrenal disorder (HCC), Allergy, unspecified not elsewhere classified, Anemia, Anesthesia, Anxiety, Arrhythmia, Arthritis, Asthma, Asymptomatic human immunodeficiency virus (HIV) infection status ([facility]), Blood transfusion, without reported diagnosis, Breath shortness, Cancer (HCC), CHF (congestive heart failure) (HCC), Chronic airway obstruction, not elsewhere classified, Clotting disorder (HCC), Cold, Congestive heart failure (HCC), Cushings syndrome (HCC), Dental disorder, Depression, Diabetic neuropathy (HCC), Emphysema, GERD (gastroesophageal reflux disease), Glaucoma, Goiter, Headache(784.0), Headache, classical migraine, Heart attack (HCC), Heart murmur, Hepatitis A, Hepatitis B, Hepatitis C, Hypertension, IBD (inflammatory bowel disease), Kidney disease, Meningitis, Muscle disorder, Myocardial infarct (HCC), Osteoporosis, unspecified, Other and unspecified angina pectoris, Pacemaker, Parathyroid disorder (HCC), Pituitary disease (HCC), Pneumonia, Psychiatric problem, Renal disorder,  "Rheumatic fever, Seizure (HCC), Sickle cell disease (HCC), Sleep apnea, Snoring, Stroke (HCC), Substance abuse (HCC), Thyroid disease, Tuberculosis, Type II or unspecified type diabetes mellitus without mention of complication, not stated as uncontrolled, Ulcer, Unspecified disorder of thyroid, Unspecified hemorrhagic conditions, Unspecified urinary incontinence, or Urinary tract infection, site not specified.   Past Surgical History         Past Surgical History:   Procedure Laterality Date    IR RECOVERY ROOM   11/25/2020     Procedure: IR RECOVERY ROOM;  Surgeon: Ir-Recovery Surgery;  Location: SURGERY [place];  Service: Recovery    COLONOSCOPY - ENDO   12/31/2015     Procedure: COLONOSCOPY - ENDO;  Surgeon: Jordan Mancuso M.D.;  Location: SURGERY [facility];  Service:     INGUINAL HERNIA LAPAROSCOPIC BILATERAL   1/13/2015     Performed by Danielito Byrne M.D. at SURGERY [facility]    FOOT SURGERY   2005     cleaned joint out            Exam:   Pulse 97   Temp 36.5 °C (97.7 °F) (Temporal)   Resp 20   Ht 1.778 m (5' 10\")   Wt 82.5 kg (181 lb 12.8 oz)   SpO2 97%  Body mass index is 26.09 kg/m².     Hearing good.    Dentition good  Alert, oriented in no acute distress.  Eye contact is good, speech goal directed, affect calm     Assessment and Plan. The following treatment and monitoring plan is recommended:    1. Medicare annual wellness visit, subsequent  Utd on hm  - HEMOGLOBIN A1C; Future  - Lipid Profile; Future  - FREE THYROXINE; Future  - CBC WITHOUT DIFFERENTIAL; Future  - PROSTATE SPECIFIC AG SCREENING; Future  - Comp Metabolic Panel; Future     2. Encounter for screening for malignant neoplasm of prostate     - PROSTATE SPECIFIC AG SCREENING; Future     Past Medical History        Past Medical History:   Diagnosis Date    Dislocation of left shoulder joint      HLD (hyperlipidemia) 11/18/2017         Past Surgical History         Past Surgical History:   Procedure Laterality Date    IR RECOVERY " ROOM   2020     Procedure: IR RECOVERY ROOM;  Surgeon: Ir-Recovery Surgery;  Location: SURGERY [place];  Service: Recovery    COLONOSCOPY - ENDO   2015     Procedure: COLONOSCOPY - ENDO;  Surgeon: Jordan Mancuso M.D.;  Location: SURGERY [facility];  Service:     INGUINAL HERNIA LAPAROSCOPIC BILATERAL   2015     Performed by Danielito Byrne M.D. at SURGERY [place] [facility]    FOOT SURGERY   2005     cleaned joint out           Social History   Social History            Tobacco Use    Smoking status: Former       Current packs/day: 0.00       Types: Cigarettes       Start date: 1998       Quit date: 2008       Years since quittin.1    Smokeless tobacco: Never   Vaping Use    Vaping status: Never Used   Substance Use Topics    Alcohol use: No       Alcohol/week: 4.0 oz       Types: 8 Cans of beer per week    Drug use: No           Family History         Family History   Problem Relation Age of Onset    Heart Attack Paternal Grandmother                Current Medications      Current Outpatient Medications:     Omega-3 Fatty Acids (FISH OIL PO), Take  by mouth., Disp: , Rfl:     VITAMIN D PO, Take  by mouth. (Patient not taking: Reported on 2025), Disp: , Rfl:     ondansetron (ZOFRAN ODT) 4 MG TABLET DISPERSIBLE, Take 4 mg by mouth. (Patient not taking: Reported on 2025), Disp: , Rfl:     senna-docusate (PERICOLACE OR SENOKOT S) 8.6-50 MG Tab, Take 1 Tablet by mouth. (Patient not taking: Reported on 2025), Disp: , Rfl:     mupirocin (BACTROBAN) 2 % Ointment, Apply 1 Application topically 2 times a day. (Patient not taking: Reported on 2025), Disp: 22 g, Rfl: 0        Patient was instructed on the use of medications, either prescriptions or OTC and informed on when the appropriate follow up time period should be. In addition, patient was also instructed that should any acute worsening occur that they should notify this clinic asap or call 911.           Services suggested:  No services needed at this time  Health Care Screening: Age-appropriate preventive services recommended by USPTF and ACIP covered by Medicare were discussed today. Services ordered if indicated and agreed upon by the patient.  Referrals offered: Community-based lifestyle interventions to reduce health risks and promote self-management and wellness, fall prevention, nutrition, physical activity, tobacco-use cessation, weight loss, and mental health services as per orders if indicated.     Discussion today about general wellness and lifestyle habits:    Prevent falls and reduce trip hazards; Cautioned about securing or removing rugs.  Have a working fire alarm and carbon monoxide detector;   Engage in regular physical activity and social activities      Follow-up: No follow-ups on file.

## 2025-03-20 ENCOUNTER — PATIENT MESSAGE (OUTPATIENT)
Dept: HEALTH INFORMATION MANAGEMENT | Facility: OTHER | Age: 65
End: 2025-03-20

## 2025-04-01 ENCOUNTER — PATIENT MESSAGE (OUTPATIENT)
Dept: HEALTH INFORMATION MANAGEMENT | Facility: OTHER | Age: 65
End: 2025-04-01

## (undated) DEVICE — DISSECT TOOL MIDAS 14BA50DX

## (undated) DEVICE — GLOVE SZ 7 BIOGEL PI MICRO - PF LF (50PR/BX 4BX/CA)

## (undated) DEVICE — BOVIE BLADE COATED &INSULATED - 25/PK

## (undated) DEVICE — Device

## (undated) DEVICE — SUTURE GENERAL

## (undated) DEVICE — SENSOR SPO2 NEO LNCS ADHESIVE (20/BX) SEE USER NOTES

## (undated) DEVICE — KIT SURGIFLO W/OUT THROMBIN - (6EA/CA)

## (undated) DEVICE — SUTURE 4-0 NUROLON CR/8 TF - (12/BX) ETHICON

## (undated) DEVICE — PATTIES SURG X-RAYCOTTONOID - 1 X 3 IN (200/CA)

## (undated) DEVICE — TRAY SRGPRP PVP IOD WT PRP - (20/CA)

## (undated) DEVICE — SUCTION INSTRUMENT YANKAUER BULBOUS TIP W/O VENT (50EA/CA)

## (undated) DEVICE — TOWELS CLOTH SURGICAL - (4/PK 20PK/CA)

## (undated) DEVICE — LACTATED RINGERS INJ 1000 ML - (14EA/CA 60CA/PF)

## (undated) DEVICE — NEEDLE NON SAFETY HYPO 22 GA X 1 1/2 IN (100/BX)

## (undated) DEVICE — GLOVE BIOGEL INDICATOR SZ 7.5 SURGICAL PF LTX - (50PR/BX 4BX/CA)

## (undated) DEVICE — GLOVE BIOGEL INDICATOR SZ 8 SURGICAL PF LTX - (50/BX 4BX/CA)

## (undated) DEVICE — MASK ANESTHESIA ADULT  - (100/CA)

## (undated) DEVICE — CONTAINER SPECIMEN BAG OR - STERILE 4 OZ W/LID (100EA/CA)

## (undated) DEVICE — TRAY CATHETER FOLEY URINE METER W/STATLOCK 350ML (10EA/CA)

## (undated) DEVICE — PERFORATER DISP TIP DGR-0

## (undated) DEVICE — DETERGENT RENUZYME PLUS 10 OZ PACKET (50/BX)

## (undated) DEVICE — SUTURE 3-0 VICRYL PLUS SH - 8X 18 INCH (12/BX)

## (undated) DEVICE — GLOVE BIOGEL SZ 7.5 SURGICAL PF LTX - (50PR/BX 4BX/CA)

## (undated) DEVICE — PEN SKIN MARKER W/RULER - (50EA/BX)

## (undated) DEVICE — STAPLER SKIN DISP - (6/BX 10BX/CA) VISISTAT

## (undated) DEVICE — TUBING C&T SET FLYING LEADS DRAIN TUBING (10EA/BX)

## (undated) DEVICE — SPHERE NAVIGATION STEALTH (5EA/TY 12TY/PK)

## (undated) DEVICE — SPONGE GAUZESTER 4 X 4 4PLY - (128PK/CA)

## (undated) DEVICE — ELECTRODE 850 FOAM ADHESIVE - HYDROGEL RADIOTRNSPRNT (50/PK)

## (undated) DEVICE — SODIUM CHL IRRIGATION 0.9% 1000ML (12EA/CA)

## (undated) DEVICE — TUBING CLEARLINK DUO-VENT - C-FLO (48EA/CA)

## (undated) DEVICE — DRAPE STRLE REG TOWEL 18X24 - (10/BX 4BX/CA)"

## (undated) DEVICE — PATTIES SURG NEURO X-RAY 1/2X1/2 (10EA/PK 20PK/CS)

## (undated) DEVICE — GLOVE BIOGEL SZ 8 SURGICAL PF LTX - (50PR/BX 4BX/CA)

## (undated) DEVICE — SET LEADWIRE 5 LEAD BEDSIDE DISPOSABLE ECG (1SET OF 5/EA)

## (undated) DEVICE — SURGIFOAM (SIZE 100) - (6EA/CA)

## (undated) DEVICE — SCALP CLIP RANEY 20-1037 (10EA/PK 20PK/CA)

## (undated) DEVICE — PIN HEAD MAYFIELD DISP. (3EA/PK 12PK/BX)

## (undated) DEVICE — KIT ROOM DECONTAMINATION

## (undated) DEVICE — LACTATED RINGERS INJ. 500 ML - (24EA/CA)

## (undated) DEVICE — ELECTRODE DUAL RETURN W/ CORD - (50/PK)

## (undated) DEVICE — CANISTER SUCTION 3000ML MECHANICAL FILTER AUTO SHUTOFF MEDI-VAC NONSTERILE LF DISP  (40EA/CA)

## (undated) DEVICE — PATTIES SURG X-RAYCOTTONOID - 1/2 X 3 IN (200/CA)

## (undated) DEVICE — PATTIES SURG NEURO X-RAY 1X1 (10EA/PK 20PK/CA)

## (undated) DEVICE — DISSECT TOOL MIDAS REX - (M-2)

## (undated) DEVICE — WATER IRRIG. STER. 1500 ML - (9/CA)

## (undated) DEVICE — PROTECTOR ULNA NERVE - (36PR/CA)

## (undated) DEVICE — BLADE CLIPPER FITS 2501 CLIPPER (BLUE)  (20EA/CA)

## (undated) DEVICE — SYRINGE ASEPTO - (50EA/CA

## (undated) DEVICE — HEMOSTAT SURG ABSORBABLE - 2 X 3 IN SURGICEL (24EA/CA)

## (undated) DEVICE — KIT ANESTHESIA W/CIRCUIT & 3/LT BAG W/FILTER (20EA/CA)

## (undated) DEVICE — NEPTUNE 4 PORT MANIFOLD - (20/PK)

## (undated) DEVICE — MIDAS LUBRICATOR DIFFUSER PACK (4EA/CA)

## (undated) DEVICE — GLOVE BIOGEL ECLIPSE PF LATEX SIZE 8.0  (50PR/BX)

## (undated) DEVICE — SET EXTENSION WITH 2 PORTS (48EA/CA) ***PART #2C8610 IS A SUBSTITUTE*****

## (undated) DEVICE — PACK CRANI - (1EA/CA)

## (undated) DEVICE — DISSECT TOOL MIDAS F2/8TA23

## (undated) DEVICE — GOWN WARMING STANDARD FLEX - (30/CA)

## (undated) DEVICE — SUTURE 2-0 VICRYL PLUS CT-1 - 8 X 18 INCH(12/BX)